# Patient Record
Sex: FEMALE | Race: WHITE | NOT HISPANIC OR LATINO
[De-identification: names, ages, dates, MRNs, and addresses within clinical notes are randomized per-mention and may not be internally consistent; named-entity substitution may affect disease eponyms.]

---

## 2018-02-16 ENCOUNTER — TRANSCRIPTION ENCOUNTER (OUTPATIENT)
Age: 48
End: 2018-02-16

## 2020-01-15 ENCOUNTER — EMERGENCY (EMERGENCY)
Facility: HOSPITAL | Age: 50
LOS: 0 days | Discharge: HOME | End: 2020-01-15
Admitting: EMERGENCY MEDICINE
Payer: COMMERCIAL

## 2020-01-15 VITALS
OXYGEN SATURATION: 99 % | TEMPERATURE: 99 F | DIASTOLIC BLOOD PRESSURE: 76 MMHG | SYSTOLIC BLOOD PRESSURE: 124 MMHG | HEART RATE: 125 BPM | RESPIRATION RATE: 18 BRPM

## 2020-01-15 VITALS — HEART RATE: 102 BPM

## 2020-01-15 DIAGNOSIS — R21 RASH AND OTHER NONSPECIFIC SKIN ERUPTION: ICD-10-CM

## 2020-01-15 DIAGNOSIS — B34.9 VIRAL INFECTION, UNSPECIFIED: ICD-10-CM

## 2020-01-15 PROCEDURE — 71046 X-RAY EXAM CHEST 2 VIEWS: CPT | Mod: 26

## 2020-01-15 PROCEDURE — 99283 EMERGENCY DEPT VISIT LOW MDM: CPT

## 2020-01-15 RX ORDER — IBUPROFEN 200 MG
600 TABLET ORAL ONCE
Refills: 0 | Status: DISCONTINUED | OUTPATIENT
Start: 2020-01-15 | End: 2020-01-15

## 2020-01-15 NOTE — ED ADULT NURSE NOTE - OBJECTIVE STATEMENT
Pt presents with c/o generalized rash since November. Endorses recent travel to [place] in December. Has seen a derm for the rash which prescribed her steroids, but has finished the steroid taper and still has the rash. Now endorses fatigue, fever, chills. No obvious deformities or trauma noted. Alert and oriented x3.

## 2020-01-15 NOTE — ED PROVIDER NOTE - NSFOLLOWUPCLINICS_GEN_ALL_ED_FT
[facility]  Infectious Disease  [address] [place] [id number]  Phone: [phone]  Fax:   Follow Up Time: 1-3 Days

## 2020-01-15 NOTE — ED PROVIDER NOTE - PROGRESS NOTE DETAILS
blood work brought in by pt reviewed, CBC, CMP, thyroid panel, ESR, ERROL all normal blood work brought in by pt reviewed, CBC, CMP, thyroid panel, ESR, ERROL all normal, pt tolerating po fluids

## 2020-01-15 NOTE — ED ADULT NURSE NOTE - CHIEF COMPLAINT QUOTE
generalized rash since November, has followed up with dermatologist. pt finished course of steroids for rash

## 2020-01-15 NOTE — ED PROVIDER NOTE - NSFOLLOWUPINSTRUCTIONS_ED_ALL_ED_FT
****Drink water; continue Vistaril as needed; continue Tylenol/Motrin as needed****        Viral Illness, Adult  Viruses are tiny germs that can get into a person's body and cause illness. There are many different types of viruses, and they cause many types of illness. Viral illnesses can range from mild to severe. They can affect various parts of the body.  Common illnesses that are caused by a virus include colds and the flu. Viral illnesses also include serious conditions such as HIV/AIDS (human immunodeficiency virus/acquired immunodeficiency syndrome). A few viruses have been linked to certain cancers.  What are the causes?  Many types of viruses can cause illness. Viruses invade cells in your body, multiply, and cause the infected cells to malfunction or die. When the cell dies, it releases more of the virus. When this happens, you develop symptoms of the illness, and the virus continues to spread to other cells. If the virus takes over the function of the cell, it can cause the cell to divide and grow out of control, as is the case when a virus causes cancer.  Different viruses get into the body in different ways. You can get a virus by:  Swallowing food or water that is contaminated with the virus.Breathing in droplets that have been coughed or sneezed into the air by an infected person.Touching a surface that has been contaminated with the virus and then touching your eyes, nose, or mouth.Being bitten by an insect or animal that carries the virus.Having sexual contact with a person who is infected with the virus.Being exposed to blood or fluids that contain the virus, either through an open cut or during a transfusion.If a virus enters your body, your body's defense system (immune system) will try to fight the virus. You may be at higher risk for a viral illness if your immune system is weak.  What are the signs or symptoms?  Symptoms vary depending on the type of virus and the location of the cells that it invades. Common symptoms of the main types of viral illnesses include:  Cold and flu viruses     Fever.Headache.Sore throat.Muscle aches.Nasal congestion.Cough.Digestive system (gastrointestinal) viruses     Fever.Abdominal pain.Nausea.Diarrhea.Liver viruses (hepatitis)     Loss of appetite.Tiredness.Yellowing of the skin (jaundice).Brain and spinal cord viruses     Fever.Headache.Stiff neck.Nausea and vomiting.Confusion or sleepiness.Skin viruses     Warts.Itching.Rash.Sexually transmitted viruses     Discharge.Swelling.Redness.Rash.How is this treated?  Viruses can be difficult to treat because they live within cells. Antibiotic medicines do not treat viruses because these drugs do not get inside cells. Treatment for a viral illness may include:  Resting and drinking plenty of fluids.Medicines to relieve symptoms. These can include over-the-counter medicine for pain and fever, medicines for cough or congestion, and medicines to relieve diarrhea.Antiviral medicines. These drugs are available only for certain types of viruses. They may help reduce flu symptoms if taken early. There are also many antiviral medicines for hepatitis and HIV/AIDS.Some viral illnesses can be prevented with vaccinations. A common example is the flu shot.  Follow these instructions at home:  Medicines        Take over-the-counter and prescription medicines only as told by your health care provider.If you were prescribed an antiviral medicine, take it as told by your health care provider. Do not stop taking the medicine even if you start to feel better.Be aware of when antibiotics are needed and when they are not needed. Antibiotics do not treat viruses. If your health care provider thinks that you may have a bacterial infection as well as a viral infection, you may get an antibiotic.  Do not ask for an antibiotic prescription if you have been diagnosed with a viral illness. That will not make your illness go away faster.Frequently taking antibiotics when they are not needed can lead to antibiotic resistance. When this develops, the medicine no longer works against the bacteria that it normally fights.General instructions     Drink enough fluids to keep your urine clear or pale yellow.Rest as much as possible.Return to your normal activities as told by your health care provider. Ask your health care provider what activities are safe for you.Keep all follow-up visits as told by your health care provider. This is important.How is this prevented?  Take these actions to reduce your risk of viral infection:  Eat a healthy diet and get enough rest.Wash your hands often with soap and water. This is especially important when you are in public places. If soap and water are not available, use hand .Avoid close contact with friends and family who have a viral illness.If you travel to areas where viral gastrointestinal infection is common, avoid drinking water or eating raw food.Keep your immunizations up to date. Get a flu shot every year as told by your health care provider.Do not share toothbrushes, nail clippers, razors, or needles with other people.Always practice safe sex.Contact a health care provider if:  You have symptoms of a viral illness that do not go away.Your symptoms come back after going away.Your symptoms get worse.Get help right away if:  You have trouble breathing.You have a severe headache or a stiff neck.You have severe vomiting or abdominal pain.This information is not intended to replace advice given to you by your health care provider. Make sure you discuss any questions you have with your health care provider.    Document Released: 04/28/2017 Document Revised: 05/31/2017 Document Reviewed: 04/28/2017  [employer] Interactive Patient Education © 2019 [employer]

## 2020-01-15 NOTE — ED PROVIDER NOTE - PROVIDER TOKENS
PROVIDER:[TOKEN:[[id number]],FOLLOWUP:[1-3 Days]],FREE:[LAST:[Your Dermatologist],PHONE:[(   )    -],FAX:[(   )    -],FOLLOWUP:[1-3 Days]]

## 2020-01-15 NOTE — ED PROVIDER NOTE - CARE PROVIDER_API CALL
Juan Carlos Rehman (MD)  Infectious Disease; Internal Medicine  [address] [place] [address]  Phone: [phone]  Fax: [phone]  Follow Up Time: 1-3 Days    Your Dermatologist,   Phone: (   )    -  Fax: (   )    -  Follow Up Time: 1-3 Days

## 2020-01-15 NOTE — ED ADULT NURSE NOTE - NSIMPLEMENTINTERV_GEN_ALL_ED
Implemented All Universal Safety Interventions:  [place] to call system. Call bell, personal items and telephone within reach. Instruct patient to call for assistance. Room bathroom lighting operational. Non-slip footwear when patient is off stretcher. Physically safe environment: no spills, clutter or unnecessary equipment. Stretcher in lowest position, wheels locked, appropriate side rails in place.

## 2020-01-15 NOTE — ED PROVIDER NOTE - PATIENT PORTAL LINK FT
You can access the FollowMyHealth Patient Portal offered by [facility] by registering at the following website: [email or web address]. By joining [employer]’s FollowMyHealth portal, you will also be able to view your health information using other applications (apps) compatible with our system.

## 2020-01-15 NOTE — ED PROVIDER NOTE - CLINICAL SUMMARY MEDICAL DECISION MAKING FREE TEXT BOX
increase po fluids; continue Motrin and Tylenol as needed as directed; ID referral- pt should have Lyme work up- not included in blood work done; dermatology follow up Friday; continue Vistaril prn; pt will follow up with PCP in 2-3 days; any new or worsening symptoms, pt will return to ER.

## 2020-01-15 NOTE — ED PROVIDER NOTE - OBJECTIVE STATEMENT
49 y.o. female without any PMH presented to the ER c/o intermittent papular rash for the past 2 months.  Pt denies travel, dizziness, SOB, nausea, vomiting, diarrhea, change in diet/meds/detergents/skin care.  Pt did travel to [facility] Dec. 4th but symptoms started in November.  All blood work done recently normal.  Pt finished steroid taper last week.  Pt also c/o fever, chills, and myalgias since Friday.  Rapid flu done at [facility] negative.  No other complaints.

## 2020-01-15 NOTE — ED ADULT TRIAGE NOTE - CHIEF COMPLAINT QUOTE
generalized rash since November, has followed up with dermatologist generalized rash since November, has followed up with dermatologist. pt finished course of steroids for rash

## 2020-01-16 NOTE — ED ADULT NURSE NOTE - NS_SISCREENINGSR_GEN_ALL_ED
Negative Propranolol Counseling:  I discussed with the patient the risks of propranolol including but not limited to low heart rate, low blood pressure, low blood sugar, restlessness and increased cold sensitivity. They should call the office if they experience any of these side effects.

## 2021-03-25 ENCOUNTER — INPATIENT (INPATIENT)
Facility: HOSPITAL | Age: 51
LOS: 3 days | Discharge: HOME | End: 2021-03-29
Attending: HOSPITALIST | Admitting: HOSPITALIST
Payer: COMMERCIAL

## 2021-03-25 VITALS
OXYGEN SATURATION: 97 % | RESPIRATION RATE: 20 BRPM | WEIGHT: 160.06 LBS | TEMPERATURE: 99 F | SYSTOLIC BLOOD PRESSURE: 145 MMHG | HEIGHT: 64 IN | DIASTOLIC BLOOD PRESSURE: 61 MMHG | HEART RATE: 125 BPM

## 2021-03-25 LAB
ALBUMIN SERPL ELPH-MCNC: 2.9 G/DL — LOW (ref 3.5–5.2)
ALP SERPL-CCNC: 121 U/L — HIGH (ref 30–115)
ALT FLD-CCNC: 27 U/L — SIGNIFICANT CHANGE UP (ref 0–41)
ANION GAP SERPL CALC-SCNC: 12 MMOL/L — SIGNIFICANT CHANGE UP (ref 7–14)
APPEARANCE UR: CLEAR — SIGNIFICANT CHANGE UP
AST SERPL-CCNC: 30 U/L — SIGNIFICANT CHANGE UP (ref 0–41)
BACTERIA # UR AUTO: ABNORMAL
BASOPHILS # BLD AUTO: 0.06 K/UL — SIGNIFICANT CHANGE UP (ref 0–0.2)
BASOPHILS NFR BLD AUTO: 0.3 % — SIGNIFICANT CHANGE UP (ref 0–1)
BILIRUB SERPL-MCNC: 0.3 MG/DL — SIGNIFICANT CHANGE UP (ref 0.2–1.2)
BILIRUB UR-MCNC: ABNORMAL
BUN SERPL-MCNC: 21 MG/DL — HIGH (ref 10–20)
CALCIUM SERPL-MCNC: 8.3 MG/DL — LOW (ref 8.5–10.1)
CHLORIDE SERPL-SCNC: 101 MMOL/L — SIGNIFICANT CHANGE UP (ref 98–110)
CO2 SERPL-SCNC: 21 MMOL/L — SIGNIFICANT CHANGE UP (ref 17–32)
COD CRY URNS QL: NEGATIVE — SIGNIFICANT CHANGE UP
COLOR SPEC: YELLOW — SIGNIFICANT CHANGE UP
CREAT SERPL-MCNC: 1.3 MG/DL — SIGNIFICANT CHANGE UP (ref 0.7–1.5)
D DIMER BLD IA.RAPID-MCNC: 2545 NG/ML DDU — HIGH (ref 0–230)
DIFF PNL FLD: ABNORMAL
EOSINOPHIL # BLD AUTO: 0.03 K/UL — SIGNIFICANT CHANGE UP (ref 0–0.7)
EOSINOPHIL NFR BLD AUTO: 0.1 % — SIGNIFICANT CHANGE UP (ref 0–8)
EPI CELLS # UR: ABNORMAL /HPF
GLUCOSE SERPL-MCNC: 169 MG/DL — HIGH (ref 70–99)
GLUCOSE UR QL: NEGATIVE MG/DL — SIGNIFICANT CHANGE UP
GRAN CASTS # UR COMP ASSIST: NEGATIVE — SIGNIFICANT CHANGE UP
HCG SERPL QL: NEGATIVE — SIGNIFICANT CHANGE UP
HCT VFR BLD CALC: 32.1 % — LOW (ref 37–47)
HGB BLD-MCNC: 11.1 G/DL — LOW (ref 12–16)
HYALINE CASTS # UR AUTO: NEGATIVE — SIGNIFICANT CHANGE UP
IMM GRANULOCYTES NFR BLD AUTO: 0.5 % — HIGH (ref 0.1–0.3)
KETONES UR-MCNC: NEGATIVE — SIGNIFICANT CHANGE UP
LACTATE SERPL-SCNC: 2.3 MMOL/L — HIGH (ref 0.7–2)
LEUKOCYTE ESTERASE UR-ACNC: ABNORMAL
LIDOCAIN IGE QN: 10 U/L — SIGNIFICANT CHANGE UP (ref 7–60)
LYMPHOCYTES # BLD AUTO: 0.57 K/UL — LOW (ref 1.2–3.4)
LYMPHOCYTES # BLD AUTO: 2.7 % — LOW (ref 20.5–51.1)
MAGNESIUM SERPL-MCNC: 1.4 MG/DL — LOW (ref 1.8–2.4)
MANUAL SMEAR VERIFICATION: SIGNIFICANT CHANGE UP
MCHC RBC-ENTMCNC: 31.4 PG — HIGH (ref 27–31)
MCHC RBC-ENTMCNC: 34.6 G/DL — SIGNIFICANT CHANGE UP (ref 32–37)
MCV RBC AUTO: 90.9 FL — SIGNIFICANT CHANGE UP (ref 81–99)
MONOCYTES # BLD AUTO: 1.7 K/UL — HIGH (ref 0.1–0.6)
MONOCYTES NFR BLD AUTO: 8 % — SIGNIFICANT CHANGE UP (ref 1.7–9.3)
NEUTROPHILS # BLD AUTO: 18.73 K/UL — HIGH (ref 1.4–6.5)
NEUTROPHILS NFR BLD AUTO: 88.4 % — HIGH (ref 42.2–75.2)
NEUTS BAND # BLD: 25 % — HIGH (ref 0–6)
NITRITE UR-MCNC: NEGATIVE — SIGNIFICANT CHANGE UP
NRBC # BLD: 0 /100 WBCS — SIGNIFICANT CHANGE UP (ref 0–0)
NRBC # BLD: 0 /100 — SIGNIFICANT CHANGE UP (ref 0–0)
NT-PROBNP SERPL-SCNC: 903 PG/ML — HIGH (ref 0–300)
PH UR: 5.5 — SIGNIFICANT CHANGE UP (ref 5–8)
PLAT MORPH BLD: NORMAL — SIGNIFICANT CHANGE UP
PLATELET # BLD AUTO: 219 K/UL — SIGNIFICANT CHANGE UP (ref 130–400)
POTASSIUM SERPL-MCNC: 3.4 MMOL/L — LOW (ref 3.5–5)
POTASSIUM SERPL-SCNC: 3.4 MMOL/L — LOW (ref 3.5–5)
PROT SERPL-MCNC: 5.3 G/DL — LOW (ref 6–8)
PROT UR-MCNC: 100 MG/DL
RBC # BLD: 3.53 M/UL — LOW (ref 4.2–5.4)
RBC # FLD: 13.4 % — SIGNIFICANT CHANGE UP (ref 11.5–14.5)
RBC BLD AUTO: NORMAL — SIGNIFICANT CHANGE UP
RBC CASTS # UR COMP ASSIST: ABNORMAL /HPF
SODIUM SERPL-SCNC: 134 MMOL/L — LOW (ref 135–146)
SP GR SPEC: 1.01 — SIGNIFICANT CHANGE UP (ref 1.01–1.03)
TRI-PHOS CRY UR QL COMP ASSIST: NEGATIVE — SIGNIFICANT CHANGE UP
TROPONIN T SERPL-MCNC: <0.01 NG/ML — SIGNIFICANT CHANGE UP
URATE CRY FLD QL MICRO: NEGATIVE — SIGNIFICANT CHANGE UP
UROBILINOGEN FLD QL: 0.2 MG/DL — SIGNIFICANT CHANGE UP (ref 0.2–0.2)
VARIANT LYMPHS # BLD: 1 % — SIGNIFICANT CHANGE UP (ref 0–5)
WBC # BLD: 21.2 K/UL — HIGH (ref 4.8–10.8)
WBC # FLD AUTO: 21.2 K/UL — HIGH (ref 4.8–10.8)
WBC UR QL: SIGNIFICANT CHANGE UP /HPF

## 2021-03-25 PROCEDURE — 71275 CT ANGIOGRAPHY CHEST: CPT | Mod: 26

## 2021-03-25 PROCEDURE — 99285 EMERGENCY DEPT VISIT HI MDM: CPT

## 2021-03-25 PROCEDURE — 71045 X-RAY EXAM CHEST 1 VIEW: CPT | Mod: 26

## 2021-03-25 PROCEDURE — 74177 CT ABD & PELVIS W/CONTRAST: CPT | Mod: 26

## 2021-03-25 RX ORDER — SODIUM CHLORIDE 9 MG/ML
1000 INJECTION INTRAMUSCULAR; INTRAVENOUS; SUBCUTANEOUS ONCE
Refills: 0 | Status: COMPLETED | OUTPATIENT
Start: 2021-03-25 | End: 2021-03-25

## 2021-03-25 RX ORDER — CEFEPIME 1 G/1
1000 INJECTION, POWDER, FOR SOLUTION INTRAMUSCULAR; INTRAVENOUS ONCE
Refills: 0 | Status: COMPLETED | OUTPATIENT
Start: 2021-03-25 | End: 2021-03-25

## 2021-03-25 RX ORDER — MAGNESIUM SULFATE 500 MG/ML
2 VIAL (ML) INJECTION ONCE
Refills: 0 | Status: COMPLETED | OUTPATIENT
Start: 2021-03-25 | End: 2021-03-25

## 2021-03-25 RX ADMIN — Medication 50 GRAM(S): at 22:48

## 2021-03-25 RX ADMIN — CEFEPIME 100 MILLIGRAM(S): 1 INJECTION, POWDER, FOR SOLUTION INTRAMUSCULAR; INTRAVENOUS at 23:39

## 2021-03-25 RX ADMIN — SODIUM CHLORIDE 1000 MILLILITER(S): 9 INJECTION INTRAMUSCULAR; INTRAVENOUS; SUBCUTANEOUS at 23:40

## 2021-03-25 RX ADMIN — Medication 2 GRAM(S): at 23:38

## 2021-03-25 RX ADMIN — SODIUM CHLORIDE 1000 MILLILITER(S): 9 INJECTION INTRAMUSCULAR; INTRAVENOUS; SUBCUTANEOUS at 23:38

## 2021-03-25 RX ADMIN — SODIUM CHLORIDE 1000 MILLILITER(S): 9 INJECTION INTRAMUSCULAR; INTRAVENOUS; SUBCUTANEOUS at 21:57

## 2021-03-25 NOTE — ED PROVIDER NOTE - SECONDARY DIAGNOSIS.
Pyelonephritis Sepsis, due to unspecified organism, unspecified whether acute organ dysfunction present

## 2021-03-25 NOTE — ED PROVIDER NOTE - PHYSICAL EXAMINATION
Physical Exam    Vital Signs: I have reviewed the initial vital signs.  Constitutional: well-nourished, appears stated age, no acute distress  Eyes: Conjunctiva pink, Sclera clear, PERRLA, EOMI.  Cardiovascular: S1 and S2, tachycardia , regular rhythm, well-perfused extremities, radial pulses equal and 2+  Respiratory: unlabored respiratory effort, clear to auscultation bilaterally no wheezing, rales and rhonchi  Gastrointestinal: soft, non-tender abdomen, no pulsatile mass, normal bowl sounds  Musculoskeletal: supple neck, no lower extremity edema, no midline tenderness  Integumentary: warm, dry, no rash  Neurologic: awake, alert, cranial nerves II-XII grossly intact, extremities’ motor and sensory functions grossly intact  Psychiatric: appropriate mood, appropriate affect

## 2021-03-25 NOTE — ED PROVIDER NOTE - CARE PLAN
Principal Discharge DX:	Shortness of breath  Secondary Diagnosis:	Pyelonephritis  Secondary Diagnosis:	Sepsis, due to unspecified organism, unspecified whether acute organ dysfunction present

## 2021-03-25 NOTE — ED PROVIDER NOTE - OBJECTIVE STATEMENT
Pt is a 51 year old female with PMH Hypothyroid presents to ED with complaints of SOB. Pt states symptom onset was Friday (03/19) with fevers, Tmax 101 f, chills and body aches. Pt states on Monday went for COVID testing both rapid and PCR which were both negative. Pt states over past few days having increased SOB worse with exertion, however denies any orthopnea. Pt also attests abdominal cramping with multiple bouts of diarrhea, non bloody and non mucoid. Pt denies any chest pain, nausea, vomiting, constipation, dysuria

## 2021-03-25 NOTE — ED PROVIDER NOTE - CLINICAL SUMMARY MEDICAL DECISION MAKING FREE TEXT BOX
51y female PMH hypothyroid on stable dose synthroid with fever/arthralgias x 1 week with negative covid pcr (had asymptomatic children tested, negative), over last few days has become sob with [place] and noted resting HR to be 140s, also with nausea, 2-3 episodes loose stool, and abd pain, no urinary symptoms, no cp, on exam vital signs appreciated, nontix appearing, conj pink dry mm neck supple no meningismus cor tachy, reg, no murmurs, lungs cta abd +bs, soft with upper abd ttp no g/r no cvat calves nontender no c/c/e neuro intact, labs and studies reviewed, covered with abx, tolerated 2L bolus without sob, d/w intensivist, will admit monitored setting

## 2021-03-25 NOTE — ED PROVIDER NOTE - NS ED ROS FT
Constitutional: (+) fever  Eyes/ENT: (-) blurry vision, (-) epistaxis  Cardiovascular: (-) chest pain, (-) syncope  Respiratory: (-) cough, (+) shortness of breath  Gastrointestinal: (-) vomiting, (+) diarrhea  Musculoskeletal: (-) neck pain, (-) back pain, (-) joint pain  Integumentary: (-) rash, (-) edema  Neurological: (-) headache, (-) altered mental status  Psychiatric: (-) hallucinations  Allergic/Immunologic: (-) pruritus

## 2021-03-26 DIAGNOSIS — E03.9 HYPOTHYROIDISM, UNSPECIFIED: ICD-10-CM

## 2021-03-26 DIAGNOSIS — A41.9 SEPSIS, UNSPECIFIED ORGANISM: ICD-10-CM

## 2021-03-26 LAB
CULTURE RESULTS: SIGNIFICANT CHANGE UP
LACTATE SERPL-SCNC: 1.1 MMOL/L — SIGNIFICANT CHANGE UP (ref 0.7–2)
MAGNESIUM SERPL-MCNC: 2.5 MG/DL — HIGH (ref 1.8–2.4)
RAPID RVP RESULT: SIGNIFICANT CHANGE UP
SARS-COV-2 RNA SPEC QL NAA+PROBE: SIGNIFICANT CHANGE UP
SPECIMEN SOURCE: SIGNIFICANT CHANGE UP
TROPONIN T SERPL-MCNC: <0.01 NG/ML — SIGNIFICANT CHANGE UP

## 2021-03-26 PROCEDURE — 99221 1ST HOSP IP/OBS SF/LOW 40: CPT

## 2021-03-26 RX ORDER — ENOXAPARIN SODIUM 100 MG/ML
40 INJECTION SUBCUTANEOUS DAILY
Refills: 0 | Status: DISCONTINUED | OUTPATIENT
Start: 2021-03-26 | End: 2021-03-29

## 2021-03-26 RX ORDER — CEFEPIME 1 G/1
1000 INJECTION, POWDER, FOR SOLUTION INTRAMUSCULAR; INTRAVENOUS EVERY 12 HOURS
Refills: 0 | Status: DISCONTINUED | OUTPATIENT
Start: 2021-03-26 | End: 2021-03-28

## 2021-03-26 RX ORDER — ACETAMINOPHEN 500 MG
650 TABLET ORAL EVERY 6 HOURS
Refills: 0 | Status: DISCONTINUED | OUTPATIENT
Start: 2021-03-26 | End: 2021-03-26

## 2021-03-26 RX ORDER — ACETAMINOPHEN 500 MG
650 TABLET ORAL EVERY 6 HOURS
Refills: 0 | Status: DISCONTINUED | OUTPATIENT
Start: 2021-03-26 | End: 2021-03-27

## 2021-03-26 RX ORDER — LEVOTHYROXINE SODIUM 125 MCG
37.5 TABLET ORAL DAILY
Refills: 0 | Status: DISCONTINUED | OUTPATIENT
Start: 2021-03-26 | End: 2021-03-29

## 2021-03-26 RX ADMIN — Medication 650 MILLIGRAM(S): at 11:40

## 2021-03-26 RX ADMIN — Medication 650 MILLIGRAM(S): at 21:45

## 2021-03-26 RX ADMIN — Medication 650 MILLIGRAM(S): at 17:48

## 2021-03-26 RX ADMIN — Medication 37.5 MICROGRAM(S): at 06:14

## 2021-03-26 RX ADMIN — ENOXAPARIN SODIUM 40 MILLIGRAM(S): 100 INJECTION SUBCUTANEOUS at 12:49

## 2021-03-26 RX ADMIN — Medication 650 MILLIGRAM(S): at 18:11

## 2021-03-26 RX ADMIN — Medication 650 MILLIGRAM(S): at 16:48

## 2021-03-26 RX ADMIN — Medication 650 MILLIGRAM(S): at 23:56

## 2021-03-26 RX ADMIN — CEFEPIME 100 MILLIGRAM(S): 1 INJECTION, POWDER, FOR SOLUTION INTRAMUSCULAR; INTRAVENOUS at 12:49

## 2021-03-26 RX ADMIN — Medication 650 MILLIGRAM(S): at 08:38

## 2021-03-26 RX ADMIN — CEFEPIME 100 MILLIGRAM(S): 1 INJECTION, POWDER, FOR SOLUTION INTRAMUSCULAR; INTRAVENOUS at 23:56

## 2021-03-26 NOTE — H&P ADULT - NSHPPHYSICALEXAM_GEN_ALL_CORE
PHYSICAL EXAM:    CONSTITUTIONAL: NAD, saturating at >90% on RA.   ENMT: EOMI, PERRLA,  neck supple, No JVD  RESPIRATORY: Clear to auscultation bilaterally; No rales, rhonchi, wheezing, or rubs  CARDIOVASCULAR: Regular rate and rhythm; No murmurs, rubs, or gallops, negative edema  GASTROINTESTINAL: Soft, + tenderness over right flank and mid lower abdomen, nondistended; Bowel sounds present  EXTREMITIES:  2+ Peripheral Pulses, No clubbing, cyanosis  PSYCH: Alert & Oriented X3, denies suicidal or homicidal ideation, denies auditory or visual hallucinations   NEURO: A&O X3, follows commands. Non focal neuro exam.   SKIN: No rashes or lesions

## 2021-03-26 NOTE — H&P ADULT - NSHPLABSRESULTS_GEN_ALL_CORE
11.1    )-----------( 219      ( 25 Mar 2021 21:25 )             32.1           134<L>  |  101  |  21<H>  ----------------------------<  169<H>  3.4<L>   |  21  |  1.3    Ca    8.3<L>      25 Mar 2021 21:25  Mg     1.4         TPro  5.3<L>  /  Alb  2.9<L>  /  TBili  0.3  /  DBili  x   /  AST  30  /  ALT  27  /  AlkPhos  121<H>                Urinalysis Basic - ( 25 Mar 2021 22:40 )    Color: Yellow / Appearance: Clear / S.015 / pH: x  Gluc: x / Ketone: Negative  / Bili: Small / Urobili: 0.2 mg/dL   Blood: x / Protein: 100 mg/dL / Nitrite: Negative   Leuk Esterase: Trace / RBC: 6-10 /HPF / WBC 3-5 /HPF   Sq Epi: x / Non Sq Epi: Few /HPF / Bacteria: Moderate      Lactate Trend   @ 02:00 Lactate:1.1    @ 22:45 Lactate:2.3     CARDIAC MARKERS ( 25 Mar 2021 21:25 )  x     / <0.01 ng/mL / x     / x     / x            CAPILLARY BLOOD GLUCOSE

## 2021-03-26 NOTE — CONSULT NOTE ADULT - ASSESSMENT
ASSESSMENT  50 YO F with a pmh of hypothyroidism who presents with abdominal pain and chills    IMPRESSION  #Sepsis present on admission (HR>90, WBC>12) secondary to Pyelonephritis  - CT Abdomen and Pelvis w/ IV Cont (03.25.21 @ 23:32): No CTA evidence of acute pulmonary embolus. Bilateral interlobular septal thickening with superimposed groundglass opacities. Findings may be seen in congestive state. Nonspecific pericholecystic fluid without surroundinginflammatory change. No intra- or extrahepatic duct dilatation. Slight delay of left nephrogram with mild surrounding inflammatory change involving the left kidney and ureter. Striated left nephrogram.. Findings are suspicious for pyelonephritis.      #Hypothyroid  #Obesity BMI (kg/m2): 29.9  #Abx allergy: NKDA      RECOMMENDATIONS  This is a preliminary incomplete pended note, all final recommendations to follow after interview and examination of the patient.    Spectra [id number]     ASSESSMENT  50 YO F with a pmh of hypothyroidism who presents with abdominal pain and chills    IMPRESSION  #Sepsis present on admission (HR>90, WBC>12) secondary to Pyelonephritis vs infectious colitis  - Has nausea, but UA with not many WBC -- more with GI symptoms   - CT Abdomen and Pelvis w/ IV Cont (03.25.21 @ 23:32): No CTA evidence of acute pulmonary embolus. Bilateral interlobular septal thickening with superimposed groundglass opacities. Findings may be seen in congestive state. Nonspecific pericholecystic fluid without surroundinginflammatory change. No intra- or extrahepatic duct dilatation. Slight delay of left nephrogram with mild surrounding inflammatory change involving the left kidney and ureter. Striated left nephrogram.. Findings are suspicious for pyelonephritis.    #Hypothyroid  #Obesity BMI (kg/m2): 29.9  #Abx allergy: NKDA      RECOMMENDATIONS  - can continue ceftriaxone 1g daily until blood cultures and urine cultures return  - agree with GI PCR  - follow-up blood cultures  - follow-up urine cultures  - trend WBC    I will be unavailable by [employer] over the weekend.   Can reach via Teams Messenger.   For urgent matters, please call ID attending on call.

## 2021-03-26 NOTE — CONSULT NOTE ADULT - SUBJECTIVE AND OBJECTIVE BOX
KIARA DRPAER  51y, Female  Allergy: No Known Allergies      CHIEF COMPLAINT: Sepsis secondary to pyelonephritis (26 Mar 2021 02:30)      LOS      HPI:  Pt is a 52 YO F with a pmh of hypothyroidism. Pt presented to the ER with a chief complaint of low grade fever associated with abdominal pain, SOB, chills, body and she also developed diarrhea yesterday. Pt had COVID testing monday 3/22/21 which was negative. In the ER, her WBC count was 21.2, 's.   CT A/P showed:     IMPRESSION:      No CTA evidence of acute pulmonary embolus.    Bilateral interlobular septal thickening with superimposed groundglass opacities. Findings may be seen in congestive state.    Nonspecific pericholecystic fluid without surrounding inflammatory change. No intra- or extrahepatic duct dilatation.    Slight delay of left nephrogram with mild surrounding inflammatory change involving the left kidney and ureter. Striated left nephrogram.. Findings are suspicious for pyelonephritis.    UA was positive for LE, moderate bacteria. Pt was given IVF and started on cefepime. ICU consulted from the ED and determined pt is stable to be admitted to low risk tele. Pt was seen and examined at bedside, pt states she feels better. Pt c/o abdominal pain that is moderate and crampy/ dull with radiation over lower abdomen exacerbated when sitting up but disappears when supine. Pt has not had diarrhea while in the ED. Pt denies any CP or palpitations but does endorse SOB with activity.       (26 Mar 2021 02:30)      INFECTIOUS DISEASE HISTORY:  History as above.   Afebrile  WBC Count: 21.20 K/uL (03.25.21 @ 21:25) on admission.   CT Abd/Pelvis showing findings concerning for left pyelonephritis.  CTA Chest negative for PE -- patchy at bases   Started on cefepime empirically      PAST MEDICAL & SURGICAL HISTORY:  Hypothyroidism    Perimenopausal    No significant past surgical history        FAMILY HISTORY  FH: Alzheimers disease        SOCIAL HISTORY  Social History:  Pt denies any smoking, ETOH/ Drug usage. (26 Mar 2021 02:30)        ROS  General: Denies rigors, nightsweats  HEENT: Denies headache, rhinorrhea, sore throat, eye pain  CV: Denies CP, palpitations  PULM: Denies wheezing, hemoptysis  GI: Denies hematemesis, hematochezia, melena  : Denies discharge, hematuria  MSK: Denies arthralgias, myalgias  SKIN: Denies rash, lesions  NEURO: Denies paresthesias, weakness  PSYCH: Denies depression, anxiety    VITALS:  T(F): 97, Max: 98.7 (21 @ 21:06)  HR: 96  BP: 110/65  RR: 18Vital Signs Last 24 Hrs  T(C): 36.1 (26 Mar 2021 05:18), Max: 37.1 (25 Mar 2021 21:06)  T(F): 97 (26 Mar 2021 05:18), Max: 98.7 (25 Mar 2021 21:06)  HR: 96 (26 Mar 2021 05:18) (76 - 125)  BP: 110/65 (26 Mar 2021 05:18) (102/64 - 145/61)  BP(mean): --  RR: 18 (26 Mar 2021 05:18) (18 - 20)  SpO2: 97% (26 Mar 2021 00:44) (97% - 97%)    PHYSICAL EXAM:  Gen: NAD, resting in bed  HEENT: Normocephalic, atraumatic  Neck: supple, no lymphadenopathy  CV: Regular rate & regular rhythm  Lungs: decreased BS at bases, no fremitus  Abdomen: Soft, BS present  Ext: Warm, well perfused  Neuro: non focal, awake  Skin: no rash, no erythema  Lines: no phlebitis    TESTS & MEASUREMENTS:                        11.1    )-----------( 219      ( 25 Mar 2021 21:25 )             32.1         134<L>  |  101  |  21<H>  ----------------------------<  169<H>  3.4<L>   |  21  |  1.3    Ca    8.3<L>      25 Mar 2021 21:25  Mg     1.4         TPro  5.3<L>  /  Alb  2.9<L>  /  TBili  0.3  /  DBili  x   /  AST  30  /  ALT  27  /  AlkPhos  121<H>      eGFR if Non African American: 48 mL/min/1.73M2 (21 @ 21:25)  eGFR if : 55 mL/min/1.73M2 (21 @ 21:25)    LIVER FUNCTIONS - ( 25 Mar 2021 21:25 )  Alb: 2.9 g/dL / Pro: 5.3 g/dL / ALK PHOS: 121 U/L / ALT: 27 U/L / AST: 30 U/L / GGT: x           Urinalysis Basic - ( 25 Mar 2021 22:40 )    Color: Yellow / Appearance: Clear / S.015 / pH: x  Gluc: x / Ketone: Negative  / Bili: Small / Urobili: 0.2 mg/dL   Blood: x / Protein: 100 mg/dL / Nitrite: Negative   Leuk Esterase: Trace / RBC: 6-10 /HPF / WBC 3-5 /HPF   Sq Epi: x / Non Sq Epi: Few /HPF / Bacteria: Moderate          Lactate, Blood: 1.1 mmol/L (21 @ 02:00)  Lactate, Blood: 2.3 mmol/L (21 @ 22:45)      INFECTIOUS DISEASES TESTING      RADIOLOGY & ADDITIONAL TESTS:  I have personally reviewed the last Chest xray  CXR      CT  CT Angio Chest PE Protocol w/ IV Cont:   EXAM:  CT ABDOMEN AND PELVIS IC        EXAM:  CT ANGIO CHEST PE PROTOCOL IC            PROCEDURE DATE:  2021            INTERPRETATION:  CLINICAL HISTORY / REASON FOR EXAM: Shortness of breath. Fever. Abdominal pain with multiple bouts of diarrhea.    TECHNIQUE: Multislice helical sections were obtained from the thoracic inlet to the lung bases during rapid administration of intravenous contrast using a CTA pulmonary embolism protocol. Thin sections were reconstructed through the pulmonary vasculature. Subsequently, images were obtained from the lung bases to the pubic symphysis. Coronal, sagittal and 3D/MIP reformatted images are also submitted.    COMPARISON: None.      FINDINGS:    CHEST:    PULMONARY EMBOLUS: No central or segmental pulmonary embolus.    LUNGS, PLEURA, AIRWAYS: Bilateral dependent interlobular septal thickening with superimposed groundglass opacities. Subsegmental atelectasis involving the right middle lobe and bilateral lung bases. Trace bilateral pleural effusions. No pneumothorax. Central airways patent.    THORACIC NODES: No mediastinal or axillary lymphadenopathy.    MEDIASTINUM/GREAT VESSELS: No pericardial effusion. Heart size unremarkable. The great vessels are normal in caliber.    ABDOMEN/PELVIS:    HEPATOBILIARY: Right hepatic cyst in addition to subcentimeter hypodensity too small to characterize. Nonspecific pericholecystic fluid without surrounding inflammatory change. No intra- or extrahepatic duct dilatation. Periportal edema    SPLEEN:Unremarkable.    PANCREAS: Unremarkable.    ADRENAL GLANDS: Unremarkable.    KIDNEYS: Slight delay of left nephrogram and straight nephrogram with mild surrounding inflammatory change involving the left kidney and ureter down to the level of the UVJ.No hydronephrosis. No visualized radiopaque calculi. Bilateral subcentimeter hypodensities too small to characterize.    ABDOMINOPELVIC NODES: Left external iliac prominent lymph node measuring 1.1 cm (7/322).    PELVIC ORGANS: Unremarkable. Left adnexa measures up to 4 cm.    PERITONEUM/MESENTERY/BOWEL: No bowel obstruction or intraperitoneal free air. Normal appendix.    BONES/SOFT TISSUES: No acute osseous abnormality. Small fat-containing umbilical hernia.    OTHER: Aorta is normal in caliber.      IMPRESSION:      No CTA evidence of acute pulmonary embolus.    Bilateral interlobular septal thickening with superimposed groundglass opacities. Findings may be seen in congestive state.    Nonspecific pericholecystic fluid without surroundinginflammatory change. No intra- or extrahepatic duct dilatation.    Slight delay of left nephrogram with mild surrounding inflammatory change involving the left kidney and ureter. Striated left nephrogram.. Findings are suspicious for pyelonephritis.            ISABELL QUILES M.D., RESIDENT RADIOLOGIST  This document has been electronically signed.  DEANGELO EDGE MD; Attending Radiologist  This document has been electronically signed. Mar 26 2021 12:31AM (21 @ 23:32)      CARDIOLOGY TESTING      MEDICATIONS  enoxaparin Injectable 40 SubCutaneous daily  levothyroxine  Oral Tab/Cap - Peds 37.5 Oral daily      Weight  Weight (kg): 79.1 (21 @ 03:00)    ANTIBIOTICS:      ALLERGIES:  No Known Allergies

## 2021-03-26 NOTE — H&P ADULT - ASSESSMENT
Pt is a 52 YO F who will be admitted for the workup and treatment of sepsis secondary to pyelonephritis vs/and ?gastroenteritis. Pt has improved, currently stable and asymptomatic at rest. Will continue cefepime, ID consult pending, f/u urine culture, f/u blood culture, check O&P, f/u stool culture, f/u GI panel, Tylenol 650mg Q6H PRN pain. Lactic acidosis resolved s/p fluid resuscitation.  Shortness of breath with elevated D-dimer. Pt is saturating well >90% on RA its likely reactive to infectious process, CTA negative for PE, will check echocardiogram since BNP is elevated to determine EF. Abdominal pain and tenderness: likely from gastroenteritis, monitor serial abdominal exams, monitor for improvement. For her synthroid- asymptomatic, cont synthroid 37.5mcg daily.        DVT ppx: LVNX  GI PPx: Diet Pt is a 50 YO F who will be admitted for the workup and treatment of sepsis secondary to pyelonephritis vs/and ?gastroenteritis. Pt has improved, currently stable and asymptomatic at rest. Will continue cefepime, ID consult pending, f/u urine culture, f/u blood culture, check O&P, f/u stool culture, f/u GI panel, Tylenol 650mg Q6H PRN pain. Lactic acidosis resolved s/p fluid resuscitation.  Shortness of breath with elevated D-dimer. Pt is saturating well >90% on RA its likely reactive to infectious process, CTA negative for PE, will check echocardiogram since BNP is elevated to determine EF. Abdominal pain and tenderness: likely from gastroenteritis, monitor serial abdominal exams, monitor for improvement. Normocytic anemia, no source of bleeding, monitor Hb.   Hypomagnesemia: s/p replacement in ED, f/u mag in AM and replace PRN. For her hypothyroidism- pt is asymptomatic, cont synthroid 37.5mcg daily.       DVT ppx: LVNX  GI PPx: Diet Pt is a 50 YO F who will be admitted for the workup and treatment of sepsis secondary to pyelonephritis vs/and ?gastroenteritis. Pt has improved, currently stable and asymptomatic at rest. Will continue cefepime, ID consult pending, f/u urine culture, f/u blood culture, check O&P, f/u stool culture, f/u GI panel, Tylenol 650mg Q6H PRN pain. Lactic acidosis resolved s/p fluid resuscitation.  Shortness of breath with elevated D-dimer. Pt is saturating well >90% on RA its likely reactive to infectious process, CTA negative for PE, will check echocardiogram since BNP is elevated to determine EF. Abdominal pain and tenderness: likely from gastroenteritis, monitor serial abdominal exams, monitor for improvement. Normocytic anemia, no source of bleeding, monitor Hb.   Hypomagnesemia: s/p replacement in ED, f/u mag in AM and replace PRN. For her hypothyroidism- pt is asymptomatic, cont synthroid 37.5mcg daily.       DVT ppx: LVNX  GI PPx: Diet    Pt agreed to full code

## 2021-03-27 DIAGNOSIS — I50.9 HEART FAILURE, UNSPECIFIED: ICD-10-CM

## 2021-03-27 DIAGNOSIS — E03.9 HYPOTHYROIDISM, UNSPECIFIED: ICD-10-CM

## 2021-03-27 LAB
ANION GAP SERPL CALC-SCNC: 12 MMOL/L — SIGNIFICANT CHANGE UP (ref 7–14)
BUN SERPL-MCNC: 12 MG/DL — SIGNIFICANT CHANGE UP (ref 10–20)
CALCIUM SERPL-MCNC: 8.2 MG/DL — LOW (ref 8.5–10.1)
CHLORIDE SERPL-SCNC: 107 MMOL/L — SIGNIFICANT CHANGE UP (ref 98–110)
CO2 SERPL-SCNC: 20 MMOL/L — SIGNIFICANT CHANGE UP (ref 17–32)
COVID-19 SPIKE DOMAIN AB INTERP: NEGATIVE — SIGNIFICANT CHANGE UP
COVID-19 SPIKE DOMAIN ANTIBODY RESULT: 0.4 U/ML — SIGNIFICANT CHANGE UP
CREAT SERPL-MCNC: 0.8 MG/DL — SIGNIFICANT CHANGE UP (ref 0.7–1.5)
CULTURE RESULTS: SIGNIFICANT CHANGE UP
GLUCOSE SERPL-MCNC: 103 MG/DL — HIGH (ref 70–99)
HCT VFR BLD CALC: 29.4 % — LOW (ref 37–47)
HGB BLD-MCNC: 10.2 G/DL — LOW (ref 12–16)
MCHC RBC-ENTMCNC: 31.2 PG — HIGH (ref 27–31)
MCHC RBC-ENTMCNC: 34.7 G/DL — SIGNIFICANT CHANGE UP (ref 32–37)
MCV RBC AUTO: 89.9 FL — SIGNIFICANT CHANGE UP (ref 81–99)
NRBC # BLD: 0 /100 WBCS — SIGNIFICANT CHANGE UP (ref 0–0)
NT-PROBNP SERPL-SCNC: 1207 PG/ML — HIGH (ref 0–300)
PLATELET # BLD AUTO: 257 K/UL — SIGNIFICANT CHANGE UP (ref 130–400)
POTASSIUM SERPL-MCNC: 3.6 MMOL/L — SIGNIFICANT CHANGE UP (ref 3.5–5)
POTASSIUM SERPL-SCNC: 3.6 MMOL/L — SIGNIFICANT CHANGE UP (ref 3.5–5)
PROCALCITONIN SERPL-MCNC: 1.17 NG/ML — HIGH (ref 0.02–0.1)
RBC # BLD: 3.27 M/UL — LOW (ref 4.2–5.4)
RBC # FLD: 13.7 % — SIGNIFICANT CHANGE UP (ref 11.5–14.5)
SARS-COV-2 IGG+IGM SERPL QL IA: 0.4 U/ML — SIGNIFICANT CHANGE UP
SARS-COV-2 IGG+IGM SERPL QL IA: NEGATIVE — SIGNIFICANT CHANGE UP
SODIUM SERPL-SCNC: 139 MMOL/L — SIGNIFICANT CHANGE UP (ref 135–146)
SPECIMEN SOURCE: SIGNIFICANT CHANGE UP
WBC # BLD: 15.06 K/UL — HIGH (ref 4.8–10.8)
WBC # FLD AUTO: 15.06 K/UL — HIGH (ref 4.8–10.8)

## 2021-03-27 PROCEDURE — 99232 SBSQ HOSP IP/OBS MODERATE 35: CPT

## 2021-03-27 RX ORDER — IBUPROFEN 200 MG
400 TABLET ORAL EVERY 6 HOURS
Refills: 0 | Status: DISCONTINUED | OUTPATIENT
Start: 2021-03-27 | End: 2021-03-29

## 2021-03-27 RX ADMIN — Medication 650 MILLIGRAM(S): at 06:09

## 2021-03-27 RX ADMIN — Medication 400 MILLIGRAM(S): at 22:01

## 2021-03-27 RX ADMIN — Medication 650 MILLIGRAM(S): at 13:43

## 2021-03-27 RX ADMIN — Medication 650 MILLIGRAM(S): at 00:00

## 2021-03-27 RX ADMIN — Medication 37.5 MICROGRAM(S): at 05:32

## 2021-03-27 RX ADMIN — Medication 650 MILLIGRAM(S): at 11:04

## 2021-03-27 RX ADMIN — CEFEPIME 100 MILLIGRAM(S): 1 INJECTION, POWDER, FOR SOLUTION INTRAMUSCULAR; INTRAVENOUS at 11:04

## 2021-03-27 RX ADMIN — Medication 650 MILLIGRAM(S): at 16:37

## 2021-03-27 RX ADMIN — Medication 650 MILLIGRAM(S): at 05:32

## 2021-03-27 RX ADMIN — Medication 650 MILLIGRAM(S): at 18:27

## 2021-03-28 ENCOUNTER — TRANSCRIPTION ENCOUNTER (OUTPATIENT)
Age: 51
End: 2021-03-28

## 2021-03-28 LAB
ALBUMIN SERPL ELPH-MCNC: 2.9 G/DL — LOW (ref 3.5–5.2)
ALP SERPL-CCNC: 117 U/L — HIGH (ref 30–115)
ALT FLD-CCNC: 67 U/L — HIGH (ref 0–41)
ANION GAP SERPL CALC-SCNC: 8 MMOL/L — SIGNIFICANT CHANGE UP (ref 7–14)
AST SERPL-CCNC: 89 U/L — HIGH (ref 0–41)
BILIRUB SERPL-MCNC: 0.2 MG/DL — SIGNIFICANT CHANGE UP (ref 0.2–1.2)
BUN SERPL-MCNC: 11 MG/DL — SIGNIFICANT CHANGE UP (ref 10–20)
CALCIUM SERPL-MCNC: 8.5 MG/DL — SIGNIFICANT CHANGE UP (ref 8.5–10.1)
CHLORIDE SERPL-SCNC: 104 MMOL/L — SIGNIFICANT CHANGE UP (ref 98–110)
CO2 SERPL-SCNC: 26 MMOL/L — SIGNIFICANT CHANGE UP (ref 17–32)
CREAT SERPL-MCNC: 0.8 MG/DL — SIGNIFICANT CHANGE UP (ref 0.7–1.5)
CULTURE RESULTS: SIGNIFICANT CHANGE UP
GLUCOSE SERPL-MCNC: 93 MG/DL — SIGNIFICANT CHANGE UP (ref 70–99)
HCT VFR BLD CALC: 31.9 % — LOW (ref 37–47)
HGB BLD-MCNC: 10.9 G/DL — LOW (ref 12–16)
MCHC RBC-ENTMCNC: 30.9 PG — SIGNIFICANT CHANGE UP (ref 27–31)
MCHC RBC-ENTMCNC: 34.2 G/DL — SIGNIFICANT CHANGE UP (ref 32–37)
MCV RBC AUTO: 90.4 FL — SIGNIFICANT CHANGE UP (ref 81–99)
NRBC # BLD: 0 /100 WBCS — SIGNIFICANT CHANGE UP (ref 0–0)
NT-PROBNP SERPL-SCNC: 1217 PG/ML — HIGH (ref 0–300)
PLATELET # BLD AUTO: 318 K/UL — SIGNIFICANT CHANGE UP (ref 130–400)
POTASSIUM SERPL-MCNC: 4 MMOL/L — SIGNIFICANT CHANGE UP (ref 3.5–5)
POTASSIUM SERPL-SCNC: 4 MMOL/L — SIGNIFICANT CHANGE UP (ref 3.5–5)
PROT SERPL-MCNC: 5.8 G/DL — LOW (ref 6–8)
RBC # BLD: 3.53 M/UL — LOW (ref 4.2–5.4)
RBC # FLD: 13.8 % — SIGNIFICANT CHANGE UP (ref 11.5–14.5)
SODIUM SERPL-SCNC: 138 MMOL/L — SIGNIFICANT CHANGE UP (ref 135–146)
SPECIMEN SOURCE: SIGNIFICANT CHANGE UP
T4 FREE SERPL-MCNC: 1.2 NG/DL — SIGNIFICANT CHANGE UP (ref 0.9–1.8)
TSH SERPL-MCNC: 2.84 UIU/ML — SIGNIFICANT CHANGE UP (ref 0.27–4.2)
WBC # BLD: 8.98 K/UL — SIGNIFICANT CHANGE UP (ref 4.8–10.8)
WBC # FLD AUTO: 8.98 K/UL — SIGNIFICANT CHANGE UP (ref 4.8–10.8)

## 2021-03-28 PROCEDURE — 71046 X-RAY EXAM CHEST 2 VIEWS: CPT | Mod: 26

## 2021-03-28 PROCEDURE — 99232 SBSQ HOSP IP/OBS MODERATE 35: CPT

## 2021-03-28 RX ORDER — DIPHENHYDRAMINE HCL 50 MG
25 CAPSULE ORAL EVERY 6 HOURS
Refills: 0 | Status: DISCONTINUED | OUTPATIENT
Start: 2021-03-28 | End: 2021-03-29

## 2021-03-28 RX ORDER — CEFTRIAXONE 500 MG/1
1000 INJECTION, POWDER, FOR SOLUTION INTRAMUSCULAR; INTRAVENOUS EVERY 24 HOURS
Refills: 0 | Status: DISCONTINUED | OUTPATIENT
Start: 2021-03-29 | End: 2021-03-29

## 2021-03-28 RX ADMIN — Medication 400 MILLIGRAM(S): at 05:20

## 2021-03-28 RX ADMIN — CEFEPIME 100 MILLIGRAM(S): 1 INJECTION, POWDER, FOR SOLUTION INTRAMUSCULAR; INTRAVENOUS at 01:06

## 2021-03-28 RX ADMIN — Medication 37.5 MICROGRAM(S): at 05:21

## 2021-03-28 RX ADMIN — Medication 400 MILLIGRAM(S): at 01:06

## 2021-03-28 RX ADMIN — Medication 400 MILLIGRAM(S): at 11:37

## 2021-03-28 RX ADMIN — Medication 400 MILLIGRAM(S): at 13:30

## 2021-03-28 RX ADMIN — Medication 400 MILLIGRAM(S): at 05:22

## 2021-03-28 RX ADMIN — Medication 400 MILLIGRAM(S): at 17:32

## 2021-03-28 NOTE — DISCHARGE NOTE PROVIDER - NSDCMRMEDTOKEN_GEN_ALL_CORE_FT
Synthroid 75 mcg (0.075 mg) oral tablet: 0.5 tab(s) orally once a day   ciprofloxacin 500 mg oral tablet: 1 tab(s) orally 2 times a day   Synthroid 75 mcg (0.075 mg) oral tablet: 0.5 tab(s) orally once a day

## 2021-03-28 NOTE — PROGRESS NOTE ADULT - ASSESSMENT
Patient is a 51 year old female with a PMHx of hypothyroidism who presented to the hospital with fevers and chills. She was found to be tachycardic in the ER with an elevated WBC and was febrile Friday morning to 100.5:  
Patient is a 51 year old female with a PMHx of hypothyroidism who presented to the hospital with fevers and chills. She was found to be tachycardic in the ER with an elevated WBC and was febrile this morning to 100.5:
Patient is a 51 year old female with a PMHx of hypothyroidism who presented to the hospital with fevers and chills. She was found to be tachycardic in the ER with an elevated WBC and was febrile Friday morning to 100.5:

## 2021-03-28 NOTE — PROGRESS NOTE ADULT - PROBLEM SELECTOR PROBLEM 2
Congestive heart failure, unspecified HF chronicity, unspecified heart failure type
R/O Congestive heart failure, unspecified HF chronicity, unspecified heart failure type
R/O CHF (congestive heart failure)

## 2021-03-28 NOTE — PROGRESS NOTE ADULT - PROBLEM SELECTOR PLAN 2
-unclear reason why BNP elevated.  -saturations are in high 90 percentile range on room air  -can repeat CXR  -ECHO unremarkable with normal EF
-given ECHO results I doubt the elevated BNP was due to CHF. CT chest had shown a congestion pattern, but repeat CXR today is unremarkable.  -saturations are in high 90 percentile range on room air  -ECHO unremarkable with normal EF.  -PE unlikely to have caused high BNP as CTA negative. Suspect BNP elevation due to sepsis. Can repeat LFT's though cirrhosis also unlikely as etiology of elevation.
-patient's CT scan on admission showed bilateral ground glass opacities suggestive of a congestive picture and she had a BNP of 900 on admission.  -no prior history of CHF though and no risk factors for heart disease such as HTN, dyslipidemia.  -ECHO ordered.

## 2021-03-28 NOTE — DISCHARGE NOTE PROVIDER - NSDCCPCAREPLAN_GEN_ALL_CORE_FT
PRINCIPAL DISCHARGE DIAGNOSIS  Diagnosis: Sepsis due to urinary tract infection  Assessment and Plan of Treatment: Pyelonephritis: completed IV antbiotic: transitioned to PO antbiotic upon discharge      SECONDARY DISCHARGE DIAGNOSES  Diagnosis: Pyelonephritis  Assessment and Plan of Treatment:

## 2021-03-28 NOTE — PROGRESS NOTE ADULT - REASON FOR ADMISSION
Sepsis secondary to pyelonephritis

## 2021-03-28 NOTE — PROGRESS NOTE ADULT - PROBLEM SELECTOR PLAN 1
-patient with sepsis, present on admission. Given CT findings suspected to be due to pyelonephritis. Seen by ID.   -can continue Cefipime 1 gram IV BID until cultures return.  -continue Lovenox for DVT prophylaxis  -tachycardia and WBC count improved this AM.   -follow-up blood culture, urine culture, stool culture.  -GI PCR negative
-patient with sepsis, present on admission. Given CT findings suspected to be due to pyelonephritis. Seen by ID.   -can continue Cefipime 1 gram IV BID until cultures return.  -continue Lovenox for DVT prophylaxis  -still tachycardic to 120 this morning.   -follow-up blood culture, urine culture, stool culture, and GI PCR panel.
-patient with sepsis, present on admission. Given CT findings suspected to be due to pyelonephritis. Seen by ID.   -cultures have returned negative. Can narrow antibiotic coverage to Ceftriaxone. If WBC continues to improve would look to change to orals.  -continue Lovenox for DVT prophylaxis  -tachycardia improved this AM. Follow-up WBC count.  -GI cultures also negative  -GI PCR negative.

## 2021-03-28 NOTE — PROGRESS NOTE ADULT - SUBJECTIVE AND OBJECTIVE BOX
Medicine Progress Note    SUBJECTIVE / OVERNIGHT EVENTS:    Patient seen by ID today. Note reviewed. She reports some pain in her left lower abdomen/flank. Also reports some shortness of breath. Denies chest pain. States her scalp is tender but denies an overt headache. Denies neck stiffness, though also states that neck feels a little sore as she's been in bed for the past few days due to feeling ill.    ADDITIONAL REVIEW OF SYSTEMS:  NEURO: Denies photophobia. No extremity weakness or tingling  : Denies hematuria. Denies dysuria  GI: Reports nausea.  PULM: Reports dyspnea. Denies cough  CV: Denies chest pain or palpitations  GEN: still febrile today morning    MEDICATIONS  (STANDING):  cefepime   IVPB 1000 milliGRAM(s) IV Intermittent every 12 hours  enoxaparin Injectable 40 milliGRAM(s) SubCutaneous daily  levothyroxine  Oral Tab/Cap - Peds 37.5 MICROGram(s) Oral daily    MEDICATIONS  (PRN):  acetaminophen   Tablet .. 650 milliGRAM(s) Oral every 6 hours PRN Temp greater or equal to 38C (100.4F), Mild Pain (1 - 3)      PHYSICAL EXAM:  Vital Signs Last 24 Hrs  T(C): 38.1 (26 Mar 2021 08:35), Max: 38.1 (26 Mar 2021 08:35)  T(F): 100.5 (26 Mar 2021 08:35), Max: 100.5 (26 Mar 2021 08:35)  HR: 120 (26 Mar 2021 08:35) (76 - 125)  BP: 110/65 (26 Mar 2021 05:18) (102/64 - 145/61)  BP(mean): --  RR: 18 (26 Mar 2021 08:35) (18 - 20)  SpO2: 96% (26 Mar 2021 08:35) (96% - 97%)  CONSTITUTIONAL: Alert, in no acute distress  ENMT: no pharyngeal injection or exudates  RESPIRATORY: Normal respiratory effort; decreased breath sounds at bases  CARDIOVASCULAR: Regular rate and rhythm, normal S1 and S2, no murmur/rub/gallop; No lower extremity edema; Peripheral pulses are 2+ bilaterally  ABDOMEN: Soft. Nondisteded. Some tenderness in left lower quadrant and flank. No rebound/guarding  PSYCH: A+O to person, place, and time; affect appropriate  NEUROLOGY: CN 2-12 are intact and symmetric; no gross acute focal motor or sensory deficits   SKIN: No rashes; no palpable lesions    LABS:                        11.1   21.20 )-----------( 219      ( 25 Mar 2021 21:25 )             32.1     03-    134<L>  |  101  |  21<H>  ----------------------------<  169<H>  3.4<L>   |  21  |  1.3    Ca    8.3<L>      25 Mar 2021 21:25  Mg     2.5     -    TPro  5.3<L>  /  Alb  2.9<L>  /  TBili  0.3  /  DBili  x   /  AST  30  /  ALT  27  /  AlkPhos  121<H>  -      CARDIAC MARKERS ( 26 Mar 2021 07:12 )  x     / <0.01 ng/mL / x     / x     / x      CARDIAC MARKERS ( 25 Mar 2021 21:25 )  x     / <0.01 ng/mL / x     / x     / x          Urinalysis Basic - ( 25 Mar 2021 22:40 )    Color: Yellow / Appearance: Clear / S.015 / pH: x  Gluc: x / Ketone: Negative  / Bili: Small / Urobili: 0.2 mg/dL   Blood: x / Protein: 100 mg/dL / Nitrite: Negative   Leuk Esterase: Trace / RBC: 6-10 /HPF / WBC 3-5 /HPF   Sq Epi: x / Non Sq Epi: Few /HPF / Bacteria: Moderate        SARS-CoV-2: NotDetec (25 Mar 2021 21:16)      RADIOLOGY & ADDITIONAL TESTS:  Imaging from Last 24 Hours:    Electrocardiogram/QTc Interval:    COORDINATION OF CARE:  Care Discussed with Consultants/Other Providers:  
Medicine Progress Note    SUBJECTIVE / OVERNIGHT EVENTS:    Patient states that she feels better overall. Denies any chest pain. Dyspnea improved. Reports a "tension" type headache that she states she's had before.    ADDITIONAL REVIEW OF SYSTEMS:  NEURO: Denies photophobia. No extremity weakness or tingling  : Denies hematuria. Denies dysuria  GI: Reports nausea.  PULM: Reports dyspnea. Denies cough  CV: Denies chest pain or palpitations  GEN: afebrile this morning    MEDICATIONS  (STANDING):  enoxaparin Injectable 40 milliGRAM(s) SubCutaneous daily  ibuprofen  Tablet. 400 milliGRAM(s) Oral every 6 hours  levothyroxine  Oral Tab/Cap - Peds 37.5 MICROGram(s) Oral daily    MEDICATIONS  (PRN):  diphenhydrAMINE 25 milliGRAM(s) Oral every 6 hours PRN Rash and/or Itching    PHYSICAL EXAM:  Vital Signs Last 24 Hrs  T(C): 37 (28 Mar 2021 05:33), Max: 37.2 (27 Mar 2021 20:47)  T(F): 98.6 (28 Mar 2021 05:33), Max: 98.9 (27 Mar 2021 20:47)  HR: 88 (28 Mar 2021 05:33) (88 - 111)  BP: 111/68 (28 Mar 2021 05:33) (111/68 - 136/72)  BP(mean): --  RR: 16 (28 Mar 2021 05:33) (16 - 16)  SpO2: 98% (28 Mar 2021 08:37) (98% - 98%)  CONSTITUTIONAL: Alert, in no acute distress  ENMT: no pharyngeal injection or exudates  RESPIRATORY: Normal respiratory effort; decreased breath sounds at bases  CARDIOVASCULAR: Regular rate and rhythm, normal S1 and S2, no murmur/rub/gallop; No lower extremity edema; Peripheral pulses are 2+ bilaterally  ABDOMEN: Soft. Nondisteded. Some tenderness in left lower quadrant and flank. No rebound/guarding  PSYCH: A+O to person, place, and time; affect appropriate  NEUROLOGY: CN 2-12 are intact and symmetric; no gross acute focal motor or sensory deficits   SKIN: No rashes; no palpable lesions    LABS:                        10.2   15.06 )-----------( 257      ( 27 Mar 2021 08:07 )             29.4     03-27    139  |  107  |  12  ----------------------------<  103<H>  3.6   |  20  |  0.8    Ca    8.2<L>      27 Mar 2021 08:07                Culture - Stool (collected 26 Mar 2021 08:30)  Source: .Stool None  Preliminary Report (27 Mar 2021 16:08):    No enteric pathogens to date: Final culture pending    No enteric gram negative rods isolated    GI PCR Panel, Stool (collected 26 Mar 2021 08:30)  Source: .Stool Feces  Final Report (26 Mar 2021 19:09):    GI PCR Results: NOT detected    *******Please Note:*******    GI panel PCR evaluates for:    Campylobacter, Plesiomonas shigelloides, Salmonella,    Vibrio, Yersinia enterocolitica, Enteroaggregative    Escherichia coli (EAEC), Enteropathogenic E.coli (EPEC),    Enterotoxigenic E. coli (ETEC) lt/st, Shiga-like    toxin-producing E. coli (STEC) stx1/stx2,    Shigella/ Enteroinvasive E. coli (EIEC), Cryptosporidium,    Cyclospora cayetanensis, Entamoeba histolytica,    Giardia lamblia, Adenovirus F 40/41, Astrovirus,    Norovirus GI/GII, Rotavirus A, Sapovirus    Culture - Blood (collected 26 Mar 2021 01:55)  Source: .Blood Blood  Preliminary Report (27 Mar 2021 14:01):    No growth to date.    Culture - Blood (collected 26 Mar 2021 01:55)  Source: .Blood Blood  Preliminary Report (27 Mar 2021 14:01):    No growth to date.    Culture - Urine (collected 26 Mar 2021 01:00)  Source: .Urine Clean Catch (Midstream)  Final Report (27 Mar 2021 14:02):    <10,000 CFU/mL Normal Urogenital Jory      SARS-CoV-2: NotDetec (25 Mar 2021 21:16)      RADIOLOGY & ADDITIONAL TESTS:  Imaging from Last 24 Hours:    Electrocardiogram/QTc Interval:    COORDINATION OF CARE:  Care Discussed with Consultants/Other Providers:  
Medicine Progress Note    SUBJECTIVE / OVERNIGHT EVENTS:    Patient states she feels better today with less dyspnea. Reports having more energy. Does report feeling a dry cough this morning with a mild sore throat.    ADDITIONAL REVIEW OF SYSTEMS:  NEURO: Denies photophobia. No extremity weakness or tingling  : Denies hematuria. Denies dysuria  GI: Reports nausea.  PULM: Reports dyspnea. Denies cough  CV: Denies chest pain or palpitations  GEN: afebrile this morning    MEDICATIONS  (STANDING):  acetaminophen   Tablet .. 650 milliGRAM(s) Oral every 6 hours  cefepime   IVPB 1000 milliGRAM(s) IV Intermittent every 12 hours  enoxaparin Injectable 40 milliGRAM(s) SubCutaneous daily  levothyroxine  Oral Tab/Cap - Peds 37.5 MICROGram(s) Oral daily    PHYSICAL EXAM:  Vital Signs Last 24 Hrs  T(C): 36.1 (27 Mar 2021 05:34), Max: 37.5 (26 Mar 2021 16:49)  T(F): 96.9 (27 Mar 2021 05:34), Max: 99.5 (26 Mar 2021 16:49)  HR: 86 (27 Mar 2021 05:34) (86 - 109)  BP: 114/66 (27 Mar 2021 05:34) (106/65 - 114/66)  BP(mean): --  RR: 16 (27 Mar 2021 05:34) (16 - 18)  SpO2: 96% (27 Mar 2021 05:15) (96% - 97%)  CONSTITUTIONAL: Alert, in no acute distress  ENMT: no pharyngeal injection or exudates  RESPIRATORY: Normal respiratory effort; decreased breath sounds at bases  CARDIOVASCULAR: Regular rate and rhythm, normal S1 and S2, no murmur/rub/gallop; No lower extremity edema; Peripheral pulses are 2+ bilaterally  ABDOMEN: Soft. Nondisteded. Some tenderness in left lower quadrant and flank. No rebound/guarding  PSYCH: A+O to person, place, and time; affect appropriate  NEUROLOGY: CN 2-12 are intact and symmetric; no gross acute focal motor or sensory deficits   SKIN: No rashes; no palpable lesions    LABS:                        10.2   15.06 )-----------( 257      ( 27 Mar 2021 08:07 )             29.4     03-    139  |  107  |  12  ----------------------------<  103<H>  3.6   |  20  |  0.8    Ca    8.2<L>      27 Mar 2021 08:07  Mg     2.5     -    TPro  5.3<L>  /  Alb  2.9<L>  /  TBili  0.3  /  DBili  x   /  AST  30  /  ALT  27  /  AlkPhos  121<H>  03-25      CARDIAC MARKERS ( 26 Mar 2021 07:12 )  x     / <0.01 ng/mL / x     / x     / x      CARDIAC MARKERS ( 25 Mar 2021 21:25 )  x     / <0.01 ng/mL / x     / x     / x          Urinalysis Basic - ( 25 Mar 2021 22:40 )    Color: Yellow / Appearance: Clear / S.015 / pH: x  Gluc: x / Ketone: Negative  / Bili: Small / Urobili: 0.2 mg/dL   Blood: x / Protein: 100 mg/dL / Nitrite: Negative   Leuk Esterase: Trace / RBC: 6-10 /HPF / WBC 3-5 /HPF   Sq Epi: x / Non Sq Epi: Few /HPF / Bacteria: Moderate        GI PCR Panel, Stool (collected 26 Mar 2021 08:30)  Source: .Stool Feces  Final Report (26 Mar 2021 19:09):    GI PCR Results: NOT detected    *******Please Note:*******    GI panel PCR evaluates for:    Campylobacter, Plesiomonas shigelloides, Salmonella,    Vibrio, Yersinia enterocolitica, Enteroaggregative    Escherichia coli (EAEC), Enteropathogenic E.coli (EPEC),    Enterotoxigenic E. coli (ETEC) lt/st, Shiga-like    toxin-producing E. coli (STEC) stx1/stx2,    Shigella/ Enteroinvasive E. coli (EIEC), Cryptosporidium,    Cyclospora cayetanensis, Entamoeba histolytica,    Giardia lamblia, Adenovirus F 40/41, Astrovirus,    Norovirus GI/GII, Rotavirus A, Sapovirus      SARS-CoV-2: NotDetec (25 Mar 2021 21:16)      RADIOLOGY & ADDITIONAL TESTS:  Imaging from Last 24 Hours:    Electrocardiogram/QTc Interval:    COORDINATION OF CARE:  Care Discussed with Consultants/Other Providers:

## 2021-03-29 ENCOUNTER — TRANSCRIPTION ENCOUNTER (OUTPATIENT)
Age: 51
End: 2021-03-29

## 2021-03-29 VITALS
DIASTOLIC BLOOD PRESSURE: 83 MMHG | SYSTOLIC BLOOD PRESSURE: 107 MMHG | OXYGEN SATURATION: 98 % | HEART RATE: 95 BPM | RESPIRATION RATE: 16 BRPM | TEMPERATURE: 98 F

## 2021-03-29 LAB
ADD ON TEST-SPECIMEN IN LAB: SIGNIFICANT CHANGE UP
ANION GAP SERPL CALC-SCNC: 7 MMOL/L — SIGNIFICANT CHANGE UP (ref 7–14)
BUN SERPL-MCNC: 8 MG/DL — LOW (ref 10–20)
CALCIUM SERPL-MCNC: 8.3 MG/DL — LOW (ref 8.5–10.1)
CHLORIDE SERPL-SCNC: 105 MMOL/L — SIGNIFICANT CHANGE UP (ref 98–110)
CO2 SERPL-SCNC: 27 MMOL/L — SIGNIFICANT CHANGE UP (ref 17–32)
CREAT SERPL-MCNC: 0.8 MG/DL — SIGNIFICANT CHANGE UP (ref 0.7–1.5)
GLUCOSE SERPL-MCNC: 106 MG/DL — HIGH (ref 70–99)
HCT VFR BLD CALC: 31.7 % — LOW (ref 37–47)
HGB BLD-MCNC: 10.7 G/DL — LOW (ref 12–16)
MANUAL DIF COMMENT BLD-IMP: SIGNIFICANT CHANGE UP
MCHC RBC-ENTMCNC: 30.6 PG — SIGNIFICANT CHANGE UP (ref 27–31)
MCHC RBC-ENTMCNC: 33.8 G/DL — SIGNIFICANT CHANGE UP (ref 32–37)
MCV RBC AUTO: 90.6 FL — SIGNIFICANT CHANGE UP (ref 81–99)
NRBC # BLD: 0 /100 WBCS — SIGNIFICANT CHANGE UP (ref 0–0)
PLATELET # BLD AUTO: 309 K/UL — SIGNIFICANT CHANGE UP (ref 130–400)
POTASSIUM SERPL-MCNC: 4.5 MMOL/L — SIGNIFICANT CHANGE UP (ref 3.5–5)
POTASSIUM SERPL-SCNC: 4.5 MMOL/L — SIGNIFICANT CHANGE UP (ref 3.5–5)
RBC # BLD: 3.5 M/UL — LOW (ref 4.2–5.4)
RBC # FLD: 13.9 % — SIGNIFICANT CHANGE UP (ref 11.5–14.5)
SODIUM SERPL-SCNC: 139 MMOL/L — SIGNIFICANT CHANGE UP (ref 135–146)
WBC # BLD: 8.16 K/UL — SIGNIFICANT CHANGE UP (ref 4.8–10.8)
WBC # FLD AUTO: 8.16 K/UL — SIGNIFICANT CHANGE UP (ref 4.8–10.8)

## 2021-03-29 PROCEDURE — 99238 HOSP IP/OBS DSCHRG MGMT 30/<: CPT

## 2021-03-29 RX ORDER — CIPROFLOXACIN LACTATE 400MG/40ML
1 VIAL (ML) INTRAVENOUS
Qty: 12 | Refills: 0
Start: 2021-03-29 | End: 2021-04-03

## 2021-03-29 RX ADMIN — Medication 400 MILLIGRAM(S): at 05:12

## 2021-03-29 RX ADMIN — Medication 400 MILLIGRAM(S): at 01:10

## 2021-03-29 RX ADMIN — Medication 400 MILLIGRAM(S): at 14:22

## 2021-03-29 RX ADMIN — Medication 400 MILLIGRAM(S): at 00:00

## 2021-03-29 RX ADMIN — Medication 400 MILLIGRAM(S): at 05:13

## 2021-03-29 RX ADMIN — Medication 37.5 MICROGRAM(S): at 05:13

## 2021-03-29 RX ADMIN — CEFTRIAXONE 100 MILLIGRAM(S): 500 INJECTION, POWDER, FOR SOLUTION INTRAMUSCULAR; INTRAVENOUS at 01:10

## 2021-03-29 NOTE — DISCHARGE NOTE NURSING/CASE MANAGEMENT/SOCIAL WORK - PATIENT PORTAL LINK FT
You can access the FollowMyHealth Patient Portal offered by [facility] by registering at the following website: [email or web address]. By joining [employer]’s FollowMyHealth portal, you will also be able to view your health information using other applications (apps) compatible with our system.

## 2021-03-31 LAB
CULTURE RESULTS: SIGNIFICANT CHANGE UP
CULTURE RESULTS: SIGNIFICANT CHANGE UP
SPECIMEN SOURCE: SIGNIFICANT CHANGE UP
SPECIMEN SOURCE: SIGNIFICANT CHANGE UP

## 2021-04-05 DIAGNOSIS — K52.9 NONINFECTIVE GASTROENTERITIS AND COLITIS, UNSPECIFIED: ICD-10-CM

## 2021-04-05 DIAGNOSIS — R77.8 OTHER SPECIFIED ABNORMALITIES OF PLASMA PROTEINS: ICD-10-CM

## 2021-04-05 DIAGNOSIS — N12 TUBULO-INTERSTITIAL NEPHRITIS, NOT SPECIFIED AS ACUTE OR CHRONIC: ICD-10-CM

## 2021-04-05 DIAGNOSIS — E03.9 HYPOTHYROIDISM, UNSPECIFIED: ICD-10-CM

## 2021-04-05 DIAGNOSIS — E87.2 ACIDOSIS: ICD-10-CM

## 2021-04-05 DIAGNOSIS — R06.02 SHORTNESS OF BREATH: ICD-10-CM

## 2021-04-05 DIAGNOSIS — E66.9 OBESITY, UNSPECIFIED: ICD-10-CM

## 2021-04-05 DIAGNOSIS — E83.42 HYPOMAGNESEMIA: ICD-10-CM

## 2021-04-05 DIAGNOSIS — R74.01 ELEVATION OF LEVELS OF LIVER TRANSAMINASE LEVELS: ICD-10-CM

## 2021-04-05 DIAGNOSIS — A41.9 SEPSIS, UNSPECIFIED ORGANISM: ICD-10-CM

## 2021-04-05 DIAGNOSIS — I48.91 UNSPECIFIED ATRIAL FIBRILLATION: ICD-10-CM

## 2021-04-08 ENCOUNTER — EMERGENCY (EMERGENCY)
Facility: HOSPITAL | Age: 51
LOS: 0 days | Discharge: HOME | End: 2021-04-08
Attending: EMERGENCY MEDICINE | Admitting: EMERGENCY MEDICINE
Payer: COMMERCIAL

## 2021-04-08 VITALS
DIASTOLIC BLOOD PRESSURE: 66 MMHG | HEART RATE: 78 BPM | SYSTOLIC BLOOD PRESSURE: 115 MMHG | RESPIRATION RATE: 18 BRPM | OXYGEN SATURATION: 100 %

## 2021-04-08 VITALS
SYSTOLIC BLOOD PRESSURE: 130 MMHG | DIASTOLIC BLOOD PRESSURE: 69 MMHG | HEART RATE: 95 BPM | OXYGEN SATURATION: 98 % | HEIGHT: 64 IN | WEIGHT: 154.98 LBS | RESPIRATION RATE: 18 BRPM | TEMPERATURE: 98 F

## 2021-04-08 DIAGNOSIS — Z79.899 OTHER LONG TERM (CURRENT) DRUG THERAPY: ICD-10-CM

## 2021-04-08 DIAGNOSIS — R20.0 ANESTHESIA OF SKIN: ICD-10-CM

## 2021-04-08 DIAGNOSIS — R07.9 CHEST PAIN, UNSPECIFIED: ICD-10-CM

## 2021-04-08 DIAGNOSIS — R07.89 OTHER CHEST PAIN: ICD-10-CM

## 2021-04-08 DIAGNOSIS — E03.9 HYPOTHYROIDISM, UNSPECIFIED: ICD-10-CM

## 2021-04-08 PROBLEM — N95.1 MENOPAUSAL AND FEMALE CLIMACTERIC STATES: Chronic | Status: ACTIVE | Noted: 2021-03-25

## 2021-04-08 LAB
ALBUMIN SERPL ELPH-MCNC: 3.9 G/DL — SIGNIFICANT CHANGE UP (ref 3.5–5.2)
ALP SERPL-CCNC: 62 U/L — SIGNIFICANT CHANGE UP (ref 30–115)
ALT FLD-CCNC: 25 U/L — SIGNIFICANT CHANGE UP (ref 0–41)
ANION GAP SERPL CALC-SCNC: 10 MMOL/L — SIGNIFICANT CHANGE UP (ref 7–14)
AST SERPL-CCNC: 24 U/L — SIGNIFICANT CHANGE UP (ref 0–41)
BASOPHILS # BLD AUTO: 0.12 K/UL — SIGNIFICANT CHANGE UP (ref 0–0.2)
BASOPHILS NFR BLD AUTO: 1.6 % — HIGH (ref 0–1)
BILIRUB SERPL-MCNC: 0.5 MG/DL — SIGNIFICANT CHANGE UP (ref 0.2–1.2)
BUN SERPL-MCNC: 8 MG/DL — LOW (ref 10–20)
CALCIUM SERPL-MCNC: 9.4 MG/DL — SIGNIFICANT CHANGE UP (ref 8.5–10.1)
CHLORIDE SERPL-SCNC: 103 MMOL/L — SIGNIFICANT CHANGE UP (ref 98–110)
CO2 SERPL-SCNC: 23 MMOL/L — SIGNIFICANT CHANGE UP (ref 17–32)
CREAT SERPL-MCNC: 0.8 MG/DL — SIGNIFICANT CHANGE UP (ref 0.7–1.5)
EOSINOPHIL # BLD AUTO: 0.03 K/UL — SIGNIFICANT CHANGE UP (ref 0–0.7)
EOSINOPHIL NFR BLD AUTO: 0.4 % — SIGNIFICANT CHANGE UP (ref 0–8)
GLUCOSE SERPL-MCNC: 117 MG/DL — HIGH (ref 70–99)
HCG SERPL QL: NEGATIVE — SIGNIFICANT CHANGE UP
HCT VFR BLD CALC: 35 % — LOW (ref 37–47)
HGB BLD-MCNC: 11.6 G/DL — LOW (ref 12–16)
IMM GRANULOCYTES NFR BLD AUTO: 0.1 % — SIGNIFICANT CHANGE UP (ref 0.1–0.3)
LYMPHOCYTES # BLD AUTO: 2.44 K/UL — SIGNIFICANT CHANGE UP (ref 1.2–3.4)
LYMPHOCYTES # BLD AUTO: 33.4 % — SIGNIFICANT CHANGE UP (ref 20.5–51.1)
MCHC RBC-ENTMCNC: 30.6 PG — SIGNIFICANT CHANGE UP (ref 27–31)
MCHC RBC-ENTMCNC: 33.1 G/DL — SIGNIFICANT CHANGE UP (ref 32–37)
MCV RBC AUTO: 92.3 FL — SIGNIFICANT CHANGE UP (ref 81–99)
MONOCYTES # BLD AUTO: 0.4 K/UL — SIGNIFICANT CHANGE UP (ref 0.1–0.6)
MONOCYTES NFR BLD AUTO: 5.5 % — SIGNIFICANT CHANGE UP (ref 1.7–9.3)
NEUTROPHILS # BLD AUTO: 4.31 K/UL — SIGNIFICANT CHANGE UP (ref 1.4–6.5)
NEUTROPHILS NFR BLD AUTO: 59 % — SIGNIFICANT CHANGE UP (ref 42.2–75.2)
NRBC # BLD: 0 /100 WBCS — SIGNIFICANT CHANGE UP (ref 0–0)
NT-PROBNP SERPL-SCNC: 107 PG/ML — SIGNIFICANT CHANGE UP (ref 0–300)
PLATELET # BLD AUTO: 548 K/UL — HIGH (ref 130–400)
POTASSIUM SERPL-MCNC: 3.8 MMOL/L — SIGNIFICANT CHANGE UP (ref 3.5–5)
POTASSIUM SERPL-SCNC: 3.8 MMOL/L — SIGNIFICANT CHANGE UP (ref 3.5–5)
PROT SERPL-MCNC: 6.7 G/DL — SIGNIFICANT CHANGE UP (ref 6–8)
RBC # BLD: 3.79 M/UL — LOW (ref 4.2–5.4)
RBC # FLD: 13.4 % — SIGNIFICANT CHANGE UP (ref 11.5–14.5)
SODIUM SERPL-SCNC: 136 MMOL/L — SIGNIFICANT CHANGE UP (ref 135–146)
TROPONIN T SERPL-MCNC: <0.01 NG/ML — SIGNIFICANT CHANGE UP
TROPONIN T SERPL-MCNC: <0.01 NG/ML — SIGNIFICANT CHANGE UP
WBC # BLD: 7.31 K/UL — SIGNIFICANT CHANGE UP (ref 4.8–10.8)
WBC # FLD AUTO: 7.31 K/UL — SIGNIFICANT CHANGE UP (ref 4.8–10.8)

## 2021-04-08 PROCEDURE — 99285 EMERGENCY DEPT VISIT HI MDM: CPT

## 2021-04-08 PROCEDURE — 71045 X-RAY EXAM CHEST 1 VIEW: CPT | Mod: 26

## 2021-04-08 RX ORDER — LEVOTHYROXINE SODIUM 125 MCG
0.5 TABLET ORAL
Qty: 0 | Refills: 0 | DISCHARGE

## 2021-04-08 NOTE — ED PROVIDER NOTE - OBJECTIVE STATEMENT
51y F w/ PMH of Hypothyroidism presents with intermittent tingling chest pain 51y F w/ PMH of Hypothyroidism presents with intermittent tingling chest pain w/ radiation to LUE for 5 days. No alleviating/worsening factors. Usually resolves on own after 15min. Last night pain increased and woke her up from sleep. Has been persistent since onset. Denies fever, chills, dizziness, lightheadedness, SOB, n/v, or numbness/tingling.

## 2021-04-08 NOTE — ED PROVIDER NOTE - NS ED ROS FT
Eyes:  No visual changes, eye pain or discharge.  ENMT:  No hearing changes, pain, no sore throat or runny nose, no difficulty swallowing  Cardiac:  No SOB or edema. No chest pain with exertion. + chest pain  Respiratory:  No cough or respiratory distress. No hemoptysis. No history of asthma or RAD.  GI:  No nausea, vomiting, diarrhea or abdominal pain.  :  No dysuria, frequency or burning.  MS:  No myalgia, muscle weakness, joint pain or back pain.  Neuro:  No headache or weakness.  No LOC.  Skin:  No skin rash.   Endocrine: + history of thyroid disease

## 2021-04-08 NOTE — ED PROVIDER NOTE - PROVIDER TOKENS
PROVIDER:[TOKEN:[[id number]],FOLLOWUP:[1-3 Days]],PROVIDER:[TOKEN:[[id number]],FOLLOWUP:[1-3 Days]],PROVIDER:[TOKEN:[[id number]],FOLLOWUP:[1-3 Days]]

## 2021-04-08 NOTE — ED PROVIDER NOTE - PATIENT PORTAL LINK FT
You can access the FollowMyHealth Patient Portal offered by [facility] by registering at the following website: [email or web address]. By joining [employer]’s FollowMyHealth portal, you will also be able to view your health information using other applications (apps) compatible with our system.

## 2021-04-08 NOTE — ED PROVIDER NOTE - IV ALTEPASE ADMIN HIDDEN
Per Fe George PA-C "Jovita Juarez will need follow up in in 4 weeks with neurovascular attending or advance practitioner   He will not require outpatient neurological testing " show

## 2021-04-08 NOTE — ED PROVIDER NOTE - CARE PROVIDER_API CALL
Boni Reyes)  Cardiovascular Disease; Internal Medicine; Interventional Cardiology  [address], [address]  [place] [address]  Phone: [phone]  Fax: [phone]  Follow Up Time: 1-3 Days    MALCOLM BURTON  Internal Medicine  Phone: [phone]  Follow Up Time: 1-3 Days    Patricia Vázquez)  Rheumatology  [address] [place] [address]  Phone: [phone]  Fax: [phone]  Follow Up Time: 1-3 Days

## 2021-04-08 NOTE — ED PROVIDER NOTE - CLINICAL SUMMARY MEDICAL DECISION MAKING FREE TEXT BOX
51y female above PMH with intermittent left CP as above, nonexertional, constant for the last 12 hours, recently admitted by me for tachycardia, found to have pyelo on ct with bandemia but also elevated BNP (negative PE study), bands cleared with abx (though cultures negative) and echo normal, on exam vital signs appreciated, well appearing, head nc/at, perrla, EOMI, conj pink op clear neck supple cor rrr lungs cta abd snt no c/c/e pulses equal calves nontender neuro intact, labs and studies reviewed and d/w patient, will d/c to keep her f/u with pmd as well as cardiology and rheum (has been having arthralgias for months). Patient counseled regarding conditions which should prompt return.

## 2021-04-08 NOTE — ED ADULT NURSE NOTE - IS THE PATIENT ABLE TO BE SCREENED?
CC: Chronic knee pain    HPI:     Kishan presents today with chronic right knee pain from probably a chronic osteoarthritis, she is here to get steroid intraarticular knee joint injection, she had it 3 month ago and has helped. She has h/o bilateral osteoarthritis of the knee joints, associated with effusion. Had a right knee injections 3 times , last one was 3 month ago, and helped her a lot for good 3 month, was the best treatmernt that helped her knee pain.       Patient Active Problem List    Diagnosis Date Noted   • Morbid obesity (CMS-HCC) 06/16/2015     Priority: High   • Diastolic congestive heart failure (CMS-HCC) 12/05/2011     Priority: High   • COPD (chronic obstructive pulmonary disease) (CMS-HCC) 04/13/2011     Priority: High   • Aortic stenosis 06/07/2012     Priority: Medium   • Aortic regurgitation 12/05/2011     Priority: Medium   • Anxiety 04/04/2016   • Carotid bruit 08/23/2013   • MEDICAL HOME 11/09/2012   • DJD (degenerative joint disease) 10/19/2012   • Obesity 04/13/2011   • Osteoporosis, idiopathic 04/13/2011       Current Outpatient Prescriptions   Medication Sig Dispense Refill   • busPIRone (BUSPAR) 7.5 MG tablet Take 1 Tab by mouth every day. TAKE 1 TAB BY MOUTH EVERY DAY. 90 Tab 1   • Ibuprofen (ADVIL) 200 MG Cap Take  by mouth.     • furosemide (LASIX) 20 MG Tab Take 1 Tab by mouth 3 times a day. 270 Tab 1   • potassium chloride SA (KDUR) 20 MEQ Tab CR Take 1 Tab by mouth every day. 90 Tab 1   • albuterol 108 (90 BASE) MCG/ACT Aero Soln inhalation aerosol Inhale 2 Puffs by mouth every 6 hours as needed. 3 Inhaler 0   • tiotropium (SPIRIVA HANDIHALER) 18 MCG Cap INHALE 1 CAP BY MOUTH EVERY DAY. 90 Cap 2   • albuterol (PROVENTIL) 2.5mg/3ml Nebu Soln solution for nebulization USE 3 ML BY NEBULIZATION ROUTE EVERY 6 HOURS AS NEEDED FOR SHORTNESS OF BREATH. 375 mL 2   • triamcinolone acetonide (KENALOG) 0.025 % Cream APPLY TO AFFECTED AREA(S) 2 TIMES A DAY. 15 g 1   • Misc. Devices Misc Oxygen  "concentrator to be used at night and prn at 2 lpm. Dx: COPD, CHF, hypoxemia. 1 Units 0   • omeprazole (PRILOSEC) 20 MG delayed-release capsule Take 1 Cap by mouth every day. 90 Cap 1   • clobetasol (TEMOVATE) 0.05 % external solution Apply sparingly bid 50 mL 0   • clotrimazole-betamethasone (LOTRISONE) 1-0.05 % CREA Apply 1 Application to affected area(s) 2 times a day. 1 Tube 1   • Lactobacillus Rhamnosus, GG, (CULTURELLE PO) Take  by mouth.     • Clobetasol Propionate 0.05 % LOTN Apply to affected area bid 1 Bottle 1   • Misc. Devices MISC 4 wheel walker, needed for osteoarthritis of the knees. 1 Each 0   • vitamin D (CHOLECALCIFEROL) 1000 UNIT TABS Take 2,000 Units by mouth every day.     • Multiple Vitamin (MULTI-VITAMIN PO) Take  by mouth.     • aspirin 81 MG tablet Take 81 mg by mouth every day.     • prometh-phenylephrine-codeine (PHENERGAN VC CODEINE) 5-6.25-10 MG/5ML Syrup Take 5 mL by mouth every 8 hours as needed. 280 mL 0   • ciprofloxacin (CILOXIN) 0.3 % Solution Place 1 Drop in both eyes every 12 hours. 1 Bottle 0     Current Facility-Administered Medications   Medication Dose Route Frequency Provider Last Rate Last Dose   • methylPREDNISolone acetate (DEPO-MEDROL) injection 80 mg  80 mg Intramuscular Once Ana Hartley M.D.             Allergies as of 06/15/2017 - Kevin as Reviewed 04/10/2017   Allergen Reaction Noted   • Fosamax  09/15/2011   • Other drug  07/06/2013   • Pcn [penicillins]  04/13/2011   • Sulfa drugs  04/13/2011        ROS: Denies any chest pain, Shortness of breath, Changes bowel or bladder, Lower extremity edema.    Physical Exam:  BP 98/60 mmHg  Pulse 81  Temp(Src) 36.9 °C (98.4 °F)  Resp 14  Ht 1.505 m (4' 11.25\")  Wt 91.8 kg (202 lb 6.1 oz)  BMI 40.53 kg/m2  SpO2 88%  Gen.: Well-developed, well-nourished, no apparent distress,pleasant and cooperative with the examination  Right Knee joint: Swelling( effusion),no tenderness,  decrease in ROM      Knee " injection.  Patient was consented. Risks and benefits discussed.  Rt knee was exposed. Site of injection was marked ( A point between lateral patellar ligament, lateral tibial plateau, and lateral femoral condyle). The skin was sterilized with (2% chlorhexidine , and 70% Isopropyl alcohol), the area was numbed with ethyl cholride , then 1 ml of DepoMedrol 80 mg + 4 ml of Lidocaine 1% were injected into the joint.      Assessment and Plan.   72 y.o. female     1. Primary osteoarthritis of right knee  Intra-articular injection of the Right knee joint is given.Patient joint movement improved to about 50% immediately after the injection.Precaustion was given to avoid bleeding . Patient advised to to uses worm compresses 2 times daily for 3 days, and RTC if any continuous pain on the joint    - NH DRAIN/INJECT LARGE JOINT/BURSA  - methylPREDNISolone acetate (DEPO-MEDROL) injection 80 mg; 1 mL by Intramuscular route Once.         Yes

## 2022-05-31 PROBLEM — Z00.00 ENCOUNTER FOR PREVENTIVE HEALTH EXAMINATION: Status: ACTIVE | Noted: 2022-05-31

## 2022-06-02 ENCOUNTER — APPOINTMENT (OUTPATIENT)
Dept: UROGYNECOLOGY | Facility: CLINIC | Age: 52
End: 2022-06-02
Payer: COMMERCIAL

## 2022-06-02 VITALS
HEIGHT: 64 IN | SYSTOLIC BLOOD PRESSURE: 116 MMHG | BODY MASS INDEX: 26.46 KG/M2 | HEART RATE: 86 BPM | WEIGHT: 155 LBS | DIASTOLIC BLOOD PRESSURE: 68 MMHG

## 2022-06-02 DIAGNOSIS — Z86.39 PERSONAL HISTORY OF OTHER ENDOCRINE, NUTRITIONAL AND METABOLIC DISEASE: ICD-10-CM

## 2022-06-02 DIAGNOSIS — Z78.9 OTHER SPECIFIED HEALTH STATUS: ICD-10-CM

## 2022-06-02 PROCEDURE — 51701 INSERT BLADDER CATHETER: CPT

## 2022-06-02 PROCEDURE — 99205 OFFICE O/P NEW HI 60 MIN: CPT | Mod: 25

## 2022-06-02 RX ORDER — NORETHINDRONE ACETATE 5 MG/1
5 TABLET ORAL
Qty: 10 | Refills: 0 | Status: ACTIVE | COMMUNITY
Start: 2022-03-01

## 2022-06-02 RX ORDER — LEVOTHYROXINE SODIUM 0.03 MG/1
25 TABLET ORAL
Qty: 135 | Refills: 0 | Status: ACTIVE | COMMUNITY
Start: 2021-10-18

## 2022-06-02 RX ORDER — MEDROXYPROGESTERONE ACETATE 10 MG/1
10 TABLET ORAL
Qty: 10 | Refills: 0 | Status: ACTIVE | COMMUNITY
Start: 2022-03-15

## 2022-06-02 RX ORDER — HYDROXYZINE HYDROCHLORIDE 25 MG/1
25 TABLET ORAL
Qty: 45 | Refills: 0 | Status: ACTIVE | COMMUNITY
Start: 2022-03-10

## 2022-06-02 RX ORDER — LEVOTHYROXINE SODIUM 0.05 MG/1
50 TABLET ORAL
Qty: 90 | Refills: 0 | Status: ACTIVE | COMMUNITY
Start: 2022-03-18

## 2022-06-03 LAB
APPEARANCE: CLEAR
BILIRUBIN URINE: NEGATIVE
BLOOD URINE: NEGATIVE
COLOR: NORMAL
GLUCOSE QUALITATIVE U: NEGATIVE
KETONES URINE: NEGATIVE
LEUKOCYTE ESTERASE URINE: NEGATIVE
NITRITE URINE: NEGATIVE
PH URINE: 6
PROTEIN URINE: NEGATIVE
SPECIFIC GRAVITY URINE: 1.02
UROBILINOGEN URINE: NORMAL

## 2022-06-04 LAB — BACTERIA UR CULT: NORMAL

## 2022-06-04 NOTE — HISTORY OF PRESENT ILLNESS
[FreeTextEntry1] : \par Pt with pelvic floor dysfunction here for urogynecologic evaluation. She describes: \par \par Chief PFD: stress incontinence\par \par Pelvic organ prolapse: no bulge, denies splinting\par Stress urinary incontinence: with cough, sneeze, laugh, for years, no prior treatment\par Overactive bladder syndrome: denies\par Voiding dysfunction: no Incomplete bladder emptying, no hesitancy \par Lower urinary tract/vaginal symptoms: no recurrent UTIs per year, no hematuria, no dysuria, no bladder pain \par Fecal incontinence: denies\par Defecatory dysfunction: sausage\par Sexual dysfunction: incontinence but can't determine if it is with penetration or orgasm\par Pelvic pain: denies\par Vaginal dryness : denies but reports itching after sex and her gyn workup is negative\par \par Her pelvic floor symptoms are significantly bothersome and negatively impacting her quality of life. \par \par

## 2022-06-04 NOTE — COUNSELING
[FreeTextEntry1] : \par We will notify you of the urine results if they are abnormal\par \par Schedule pessary fitting with my PALaura\par \par Referral to pelvic floor physical therapy\par \par [facility]\par [address], [place] [address]\par Phone: [phone]\par

## 2022-06-04 NOTE — PHYSICAL EXAM
[Chaperone Present] : A chaperone was present in the examining room during all aspects of the physical examination [FreeTextEntry1] : Void: 50 cc\par PVR: 15 cc\par Urethra was prepped in sterile fashion and then a sterile catheter (14F) was used by me to drain the bladder. The patient tolerated the procedure well.\par \par No abdominal incisions noted\par normal perineal sensation\par normal perineal reflexes\par negative cough stress test\par positive atrophy\par no prolapse\par positive urethral hypermobility\par bilateral levator ani spasm,  mild tenderness on the R\par no urethral tenderness\par no bladder tenderness\par no cervical tenderness\par 1/5 Kegel\par

## 2022-06-04 NOTE — DISCUSSION/SUMMARY
[FreeTextEntry1] : \par Stress incontinence-\par The pathophysiology of the above condition was discussed with the patient. The management options for stress incontinence were discussed including observation, pessary placement, pelvic floor physical therapy or surgery (retropubic sling).  We will follow up on her urine tests. The patient voiced understanding and agrees with a referral to PT and pessary management. She will be scheduled for a pessary fitting. \par

## 2022-06-24 ENCOUNTER — APPOINTMENT (OUTPATIENT)
Dept: UROGYNECOLOGY | Facility: CLINIC | Age: 52
End: 2022-06-24
Payer: COMMERCIAL

## 2022-06-24 VITALS
WEIGHT: 155 LBS | HEART RATE: 76 BPM | BODY MASS INDEX: 26.46 KG/M2 | SYSTOLIC BLOOD PRESSURE: 104 MMHG | DIASTOLIC BLOOD PRESSURE: 71 MMHG | HEIGHT: 64 IN

## 2022-06-24 PROCEDURE — A4562: CPT

## 2022-06-24 PROCEDURE — 57160 INSERT PESSARY/OTHER DEVICE: CPT

## 2022-06-24 NOTE — DISCUSSION/SUMMARY
[FreeTextEntry1] : \par Stress Incontinence:\par Dish with support with knob #3 placed without difficulty. Remained in place with Valsalva, coughing, and ambulating. Patient felt pressure and that the pessary was close to the opening \par Dish with support with knob #2 placed without difficulty. Remained in place with Valsalva, coughing, and ambulating. Comfortable for the patient\par Patient learnt how to self maintain pessary in the office\par The patient tolerated all fittings well. Patient left with NEW dish with support with knob #2 in place\par Will return for pessary check in 2-3 weeks or earlier if she has any issues\par \par

## 2022-06-24 NOTE — COUNSELING
These refills need to go to Dr. Chani Castro, who just saw her. [FreeTextEntry1] : \par Please call the office if you have any issues with vaginal pain, vaginal bleeding, difficulty urinating or having a bowel movement or if the pessary falls out so that we can arrange another size pessary.\par \par \par Please follow up for pessary maintenance in 3 weeks with CONG Sanchez\par

## 2022-06-24 NOTE — HISTORY OF PRESENT ILLNESS
[FreeTextEntry1] : Patient is here for pessary fitting. \par Last seen 6/2/2022 for stress incontinence\par \par Not sexually active\par \par No complaints today.\par

## 2022-07-14 ENCOUNTER — APPOINTMENT (OUTPATIENT)
Dept: UROGYNECOLOGY | Facility: CLINIC | Age: 52
End: 2022-07-14

## 2022-07-14 VITALS
HEIGHT: 64 IN | HEART RATE: 76 BPM | WEIGHT: 153 LBS | DIASTOLIC BLOOD PRESSURE: 81 MMHG | BODY MASS INDEX: 26.12 KG/M2 | SYSTOLIC BLOOD PRESSURE: 123 MMHG

## 2022-07-14 PROCEDURE — A4562: CPT

## 2022-07-14 PROCEDURE — 57160 INSERT PESSARY/OTHER DEVICE: CPT

## 2022-07-14 NOTE — DISCUSSION/SUMMARY
[FreeTextEntry1] : \par Stress Incontinence:\par Dish with support with knob #2 removed, cleaned, inspected and NOT reinserted. The patient tolerated this well. Pessary handed to the patient. \par No bleeding, lesions, abnormal discharge were noted. \par NEW [facility] with support with knob #3 placed without difficulty. Remained in place with Valsalva, coughing, and ambulating. \par The patient tolerated all fittings well. Patient left with pessary in place\par Will return for pessary check in 2-3 weeks or earlier if she has any issues\par \par \par The management options for stress incontinence were discussed including observation, pessary placement, pelvic floor physical therapy or surgery. The patient voiced understanding and agrees with trying another pessary today.

## 2022-07-14 NOTE — COUNSELING
[FreeTextEntry1] : \par Please call the office if you have any issues with vaginal pain, vaginal bleeding, difficulty urinating or having a bowel movement or if the pessary falls out so that we can arrange another size pessary.\par \par Please follow up for pessary maintenance in 2-3 weeks with CONG Sanchez or CONG Gomez\par

## 2022-07-14 NOTE — HISTORY OF PRESENT ILLNESS
[FreeTextEntry1] : Patient is here for routine pessary cleaning for stress incontinence.\par Last seen 6/24/2022 for pessary fitting. Dish with support with knob #2\par \par not sexually active\par \par Today, patient is doing well and would like to try a larger size pessary. She has been taking it in and out approximately once a week. She notes that she has no leakage with jumping, but she does have leakage of urine with coughing. \par \par She has information regarding pelvic floor physical therapy, but would like to try another pessary first.

## 2022-08-01 ENCOUNTER — APPOINTMENT (OUTPATIENT)
Dept: UROGYNECOLOGY | Facility: CLINIC | Age: 52
End: 2022-08-01

## 2022-08-01 VITALS
HEART RATE: 62 BPM | SYSTOLIC BLOOD PRESSURE: 123 MMHG | BODY MASS INDEX: 26.46 KG/M2 | HEIGHT: 64 IN | WEIGHT: 155 LBS | DIASTOLIC BLOOD PRESSURE: 86 MMHG

## 2022-08-01 DIAGNOSIS — N39.3 STRESS INCONTINENCE (FEMALE) (MALE): ICD-10-CM

## 2022-08-01 PROCEDURE — 99214 OFFICE O/P EST MOD 30 MIN: CPT

## 2022-08-01 NOTE — HISTORY OF PRESENT ILLNESS
[FreeTextEntry1] : Patient is here for routine pessary cleaning for stress incontinence.\par Last seen 7/14/2022 for routine pessary cleaning. Dish with support with knob #3\par \par not sexually active\par \par s/p dish with support with knob #2, continued to have leakage with coughing\par dish with support with knob #3\par \par Today, patient is doing well. She has been taking the pessary in and out approximately once a week. She notes that she has leakage of urine with coughing and with movement. She feels that she was doing better with the smaller pessary, did not bring it to the office today but knows how to self maintain and insert the pessary. \par \par She started first session of pelvic floor physical therapy. \par \par \par  \par

## 2022-08-01 NOTE — COUNSELING
[FreeTextEntry1] : Please call the office if you have any issues with vaginal pain, vaginal bleeding, difficulty urinating or having a bowel movement or if the pessary falls out so that we can arrange another size pessary.\par \par You can insert the dish with support with knob #2 at home, call with any questions\par \par Please call the office if you feel like you do not have enough improvement of your symptoms towards the end of your physical therapy treatment so that we can arrange the next step of management. \par \par Please follow up for pessary maintenance in 3 months with CONG Sanchez or CONG Gomez\par

## 2022-08-01 NOTE — END OF VISIT
[Time Spent: ___ minutes] : I have spent [unfilled] minutes of time on the encounter. [>50% of the face to face encounter time was spent on counseling and/or coordination of care for ___] : Greater than 50% of the face to face encounter time was spent on counseling and/or coordination of care for [unfilled] 36.7

## 2022-08-01 NOTE — DISCUSSION/SUMMARY
[FreeTextEntry1] : \par Stress incontinence:\par Dish with support with knob #3 removed, cleaned, inspected and NOT reinserted. The patient tolerated this well. Pessary handed to the patient. \par No bleeding, lesions, abnormal discharge were noted. \par Patient would like to insert the dish with support with knob #2 at home, she self maintains weekly\par The patient will follow up in 3 months for routine pessary management.\par \par She will continue with PT, will call with any issues \par \par \par

## 2022-11-01 ENCOUNTER — NON-APPOINTMENT (OUTPATIENT)
Age: 52
End: 2022-11-01

## 2022-11-03 ENCOUNTER — APPOINTMENT (OUTPATIENT)
Dept: UROGYNECOLOGY | Facility: CLINIC | Age: 52
End: 2022-11-03

## 2024-09-20 NOTE — H&P ADULT - HISTORY OF PRESENT ILLNESS
Pt is a 52 YO F with a pmh of hypothyroidism. Pt presented to the ER with a chief complaint of low grade fever associated with abdominal pain, SOB, chills, body and she also developed diarrhea yesterday. Pt had COVID testing monday 3/22/21 which was negative. In the ER, her WBC count was 21.2, 's.   CT A/P showed:     IMPRESSION:      No CTA evidence of acute pulmonary embolus.    Bilateral interlobular septal thickening with superimposed groundglass opacities. Findings may be seen in congestive state.    Nonspecific pericholecystic fluid without surrounding inflammatory change. No intra- or extrahepatic duct dilatation.    Slight delay of left nephrogram with mild surrounding inflammatory change involving the left kidney and ureter. Striated left nephrogram.. Findings are suspicious for pyelonephritis.    UA was positive for LE, moderate bacteria. Pt was given IVF and started on cefepime. ICU consulted from the ED and determined pt is stable to be admitted to low risk tele. Pt was seen and examined at bedside, pt states she feels better. Pt c/o abdominal pain that is moderate and crampy/ dull with radiation over lower abdomen exacerbated when sitting up but disappears when supine. Pt has not had diarrhea while in the ED. Pt denies any CP or palpitations but does endorse SOB with activity.      
[facility] Assessment Completed on: 20-Sep-2024 15:19

## 2024-11-07 NOTE — ED PROVIDER NOTE - CARE PROVIDERS DIRECT ADDRESSES
How can you care for yourself at home?  To reduce pain and facial swelling, put an ice or cold pack on the outside of your cheek for 10 to 20 minutes at a time. Put a thin cloth between the ice and your skin. Do not use heat.  If your doctor prescribed antibiotics, take them as directed. Do not stop taking them just because you feel better. You need to take the full course of antibiotics.  Take ibuprofen as prescribed  Apply 5 mL of viscous lidocaine to a black tea bag (I.e. Elias tea) and place next to affected teeth for 15 minutes at a time  Avoid very hot, cold, or sweet foods and drinks if they increase your pain.  Rinse your mouth with warm salt water every 2 hours to help relieve pain and swelling. Mix 1 teaspoon (5 mL) of salt in 1 cup (250 mL) of water.  Talk to your dentist about using special toothpaste for sensitive teeth. To reduce pain on contact with heat or cold or when brushing, brush with this toothpaste regularly or rub a small amount of the paste on the sensitive area with a clean finger 2 or 3 times a day. Floss gently between your teeth.  Do not smoke or use spit tobacco. Tobacco use can make gum problems worse, decreases your ability to fight infection in your gums, and delays healing. If you need help quitting, talk to your doctor about stop-smoking programs and medicines. These can increase your chances of quitting for good.    Return to ED if:  You have signs of infection, such as:  Increased pain, swelling, warmth, or redness.  Not tolerating water by mouth  Not able to swallow your saliva  Pus draining from the area.  A fever.    DENTAL RESOURCES        [facility] [address] [place] [address] [phone]   [email or web address] [email or web address] Services: Dental exams, x-rays, preventative treatment, restorative treatment such as fillings, extractions, root canals of the  [email or web address],DirectAddress_Unknown

## 2024-11-08 NOTE — DISCHARGE NOTE PROVIDER - HOSPITAL COURSE
----- Message from TIARRAMayuri sent at 11/8/2024  2:32 PM CST -----  Contact: Self [phone]  Would like to receive medical advice.    Would they like a call back or a response via [facility]: call back     Additional information:  Calling to speak with the office about if the paperwork was filled out and sent to Dr. lopez   The patient is a 51 year old female with a PMHx of hypothyroidism.  She presented to the hospital with fevers and chills. She was found to be tachycardic in the ER with an elevated WBC and was febrile here with a max temperature of 100.5 on Friday morning. The patient was found to have a WBC of 21.20 at the time of admission. At the time of admisison, given the fever to 100.5, the tachycardia to 120, and the high WBC she met SIRS criteria. She had a CT scan done of the chest and abdomen and it showed no evidence of a pulmonary embolism. There was bilateral interlobular septal thickening with superimposed groundglass opacities. Nonspecific pericholecystic fluid was also seen without surrounding inflammatory changes. There was no intrahepatic or extrahepatic ductal dilatation. There was a slight delay of the left nephrogram with mild surrounding inflammatory changes involving the left kidney and left ureter. There was a striated left nephrogram. The findings were suspicious for pyelonephritis and given that she met SIRS criteria with the above CT findings the presumptive diagnosis was sepsis due to pyelonephritis. She was treated initially with IV Cefipime. She was seen in consultation by infectious diseases. ID felt she had sepsis either due to pyelonephritis or an infectious colitis. They recommended ceftriaxone until cultures returned. They also recommended trending her WBC count and checking GI PCR. The patient's blood cultures from 3/26 showed no growth to date. Her urine culture from 3/26 also showed <10,000 CFU of normal urogenital sugey. Her stool culture showed no protozoa or enteric pathogens. GI PCR was negative. COVID PCR was also negative. The patient was initially treated with Cefipime then switched to Ceftriaxone. Her WBC has now normalized. She hasn't had any further fevers since Friday morning (72 hours now). She reports feeling much better. Today, on the day of discharge she denies feeling dizzy or lightheaded. Denies abdominal pain. States she's eating and drinking okay with no issues. She denies any chest pain, flank pain, or urinary issues. She denies any shortness of breath. She did have an elevated BNP while here to 1217. This was ultimately felt to be elevated due to her sepsis. She had an ECHO checked which showed her left ventricle to be normal in terms of size and function. No segmental wall motion abnormalities were seen. The CTA ruled out a PE as a cause of the elevated BNP. She didn't exhibit any CHF symptoms and her last oxygen saturation was 98% on RA. A repeat CXR was done on 3/28 showing no evidence of any acute cardiopulmonary disease. Her LFT's were mildly elevated as well. Her ALK PHOS was as high as 117. Her AST was as high as 89. Today on the day of discharge her AST is 75. Her ALK PHOS is now normal at 106. And her ALT is 72. She denies any abdominal pain. States that she's eating okay without any abdominal pain or nausea or vomiting. A Lipase was checked at the time of admission and was normal at 10. Given the lack of GI symptoms and only a mild elevation in her transaminases she can be discharged but was instructed to follow-up with her PCP to have the tests repeated. She was also instructed to have the BNP repeated and to complete her course of antibiotic therapy. The patient is in agreement with the discharge plan and expressed understanding and agreement with it.